# Patient Record
Sex: FEMALE | Race: WHITE | NOT HISPANIC OR LATINO | Employment: OTHER | URBAN - METROPOLITAN AREA
[De-identification: names, ages, dates, MRNs, and addresses within clinical notes are randomized per-mention and may not be internally consistent; named-entity substitution may affect disease eponyms.]

---

## 2017-02-24 ENCOUNTER — TRANSCRIBE ORDERS (OUTPATIENT)
Dept: ADMINISTRATIVE | Facility: HOSPITAL | Age: 52
End: 2017-02-24

## 2017-02-24 DIAGNOSIS — Z12.31 VISIT FOR SCREENING MAMMOGRAM: Primary | ICD-10-CM

## 2017-02-27 ENCOUNTER — HOSPITAL ENCOUNTER (OUTPATIENT)
Dept: MAMMOGRAPHY | Facility: HOSPITAL | Age: 52
Discharge: HOME | End: 2017-02-27
Admitting: OBSTETRICS & GYNECOLOGY

## 2017-02-27 DIAGNOSIS — Z12.31 VISIT FOR SCREENING MAMMOGRAM: ICD-10-CM

## 2017-02-27 PROCEDURE — 77067 SCR MAMMO BI INCL CAD: CPT | Performed by: RADIOLOGY

## 2017-02-27 PROCEDURE — G0202 SCR MAMMO BI INCL CAD: HCPCS

## 2017-02-27 PROCEDURE — 77063 BREAST TOMOSYNTHESIS BI: CPT | Performed by: RADIOLOGY

## 2017-02-27 PROCEDURE — 77063 BREAST TOMOSYNTHESIS BI: CPT

## 2017-03-27 ENCOUNTER — OFFICE VISIT (OUTPATIENT)
Dept: INTERNAL MEDICINE | Facility: CLINIC | Age: 52
End: 2017-03-27

## 2017-03-27 VITALS
SYSTOLIC BLOOD PRESSURE: 125 MMHG | BODY MASS INDEX: 35.55 KG/M2 | HEIGHT: 69 IN | OXYGEN SATURATION: 95 % | HEART RATE: 97 BPM | WEIGHT: 240 LBS | DIASTOLIC BLOOD PRESSURE: 80 MMHG | TEMPERATURE: 98.4 F

## 2017-03-27 DIAGNOSIS — F34.1 DYSTHYMIA: ICD-10-CM

## 2017-03-27 DIAGNOSIS — E03.9 ACQUIRED HYPOTHYROIDISM: ICD-10-CM

## 2017-03-27 DIAGNOSIS — K21.9 GASTROESOPHAGEAL REFLUX DISEASE WITHOUT ESOPHAGITIS: ICD-10-CM

## 2017-03-27 DIAGNOSIS — E78.2 MIXED HYPERLIPIDEMIA: Primary | ICD-10-CM

## 2017-03-27 PROCEDURE — 99214 OFFICE O/P EST MOD 30 MIN: CPT | Performed by: INTERNAL MEDICINE

## 2017-03-27 RX ORDER — OMEPRAZOLE 20 MG/1
CAPSULE, DELAYED RELEASE ORAL
COMMUNITY
Start: 2013-04-04 | End: 2017-05-29 | Stop reason: SDUPTHER

## 2017-03-27 RX ORDER — SIMVASTATIN 40 MG
TABLET ORAL
COMMUNITY
Start: 2013-04-04 | End: 2017-06-26 | Stop reason: SDUPTHER

## 2017-03-27 RX ORDER — TRIAMCINOLONE ACETONIDE 0.25 MG/G
CREAM TOPICAL
COMMUNITY
Start: 2013-02-07 | End: 2017-12-11 | Stop reason: SDUPTHER

## 2017-03-27 NOTE — PROGRESS NOTES
Subjective  Danny Arvizu is a 51 y.o. female    HPI coming in for follow-up patient with a history of hypothyroidism with depression history of dyslipidemia and reflux esophagitis recent weight gain noted. Has not been exercising regularly poor compliance with diet denies polyuria polydipsia denies significant alcohol use no tobacco use    The following portions of the patient's history were reviewed and updated as appropriate: allergies, current medications, past family history, past medical history, past social history, past surgical history, and problem list.     Review of Systems   Constitutional: Negative.  Negative for activity change, appetite change, fatigue and fever.   HENT: Positive for congestion and sore throat. Negative for ear discharge, ear pain and trouble swallowing.    Eyes: Negative for photophobia and visual disturbance.   Respiratory: Positive for cough. Negative for shortness of breath.    Cardiovascular: Negative for chest pain and palpitations.   Gastrointestinal: Negative for abdominal distention, abdominal pain, constipation, diarrhea, nausea and vomiting.   Endocrine: Negative.    Genitourinary: Negative for dysuria, hematuria and urgency.   Musculoskeletal: Positive for arthralgias. Negative for back pain, joint swelling and myalgias.   Skin: Negative for color change and rash.   Allergic/Immunologic: Negative.    Neurological: Negative for dizziness, weakness, light-headedness and headaches.   Hematological: Negative for adenopathy. Does not bruise/bleed easily.   Psychiatric/Behavioral: Negative for agitation, confusion and dysphoric mood. The patient is not nervous/anxious.        Vitals:    03/27/17 1411   BP: 125/80   Pulse: 97   Temp: 98.4 °F (36.9 °C)   SpO2: 95%       Objective  Physical Exam   Constitutional: She is oriented to person, place, and time. She appears well-developed and well-nourished. No distress.   HENT:   Nose: Nose normal.   Mouth/Throat: Oropharynx is clear  and moist.   Eyes: Conjunctivae and EOM are normal. No scleral icterus.   Neck: No tracheal deviation present. No thyromegaly present.   Cardiovascular: Normal rate and regular rhythm.  Exam reveals no friction rub.    No murmur heard.  Pulmonary/Chest: No respiratory distress. She has no wheezes. She has no rales.   Abdominal: Soft. She exhibits no distension and no mass. There is no tenderness. There is no guarding.   Musculoskeletal: Normal range of motion. She exhibits no deformity.   Lymphadenopathy:     She has no cervical adenopathy.   Neurological: She is alert and oriented to person, place, and time. She has normal reflexes. No cranial nerve deficit. Coordination normal.   Skin: Skin is warm and dry. No rash noted. No erythema.   Psychiatric: She has a normal mood and affect. Her behavior is normal. Judgment and thought content normal.       Diagnoses and all orders for this visit:    Mixed hyperlipidemia continue with the statins follow lipid profile discussed diet and weight loss    Gastroesophageal reflux disease without esophagitis continue with reflux precautions and proton pump inhibitors    Acquired hypothyroidism clinically euthyroid follow TSH    Dysthymia stable on current meds

## 2017-03-28 LAB
ALBUMIN SERPL-MCNC: 4.7 G/DL (ref 3.5–5)
ALBUMIN/GLOB SERPL: 1.3 G/DL (ref 1–2)
ALP SERPL-CCNC: 75 U/L (ref 38–126)
ALT SERPL-CCNC: 53 U/L (ref 13–69)
AST SERPL-CCNC: 32 U/L (ref 15–46)
BILIRUB SERPL-MCNC: 0.4 MG/DL (ref 0.2–1.3)
BUN SERPL-MCNC: 13 MG/DL (ref 7–20)
BUN/CREAT SERPL: 14.4 (ref 7.1–23.5)
CALCIUM SERPL-MCNC: 10.1 MG/DL (ref 8.4–10.2)
CHLORIDE SERPL-SCNC: 104 MMOL/L (ref 98–107)
CO2 SERPL-SCNC: 29 MMOL/L (ref 26–30)
CREAT SERPL-MCNC: 0.9 MG/DL (ref 0.6–1.3)
GLOBULIN SER CALC-MCNC: 3.6 GM/DL
GLUCOSE SERPL-MCNC: 66 MG/DL (ref 74–98)
HBA1C MFR BLD: 6 %
LDLC SERPL DIRECT ASSAY-MCNC: 110 MG/DL (ref 0–99)
POTASSIUM SERPL-SCNC: 4.4 MMOL/L (ref 3.5–5.1)
PROT SERPL-MCNC: 8.3 G/DL (ref 6.3–8.2)
SODIUM SERPL-SCNC: 143 MMOL/L (ref 137–145)
TSH SERPL DL<=0.005 MIU/L-ACNC: 3.25 MIU/ML (ref 0.47–4.68)

## 2017-04-17 ENCOUNTER — OFFICE VISIT (OUTPATIENT)
Dept: INTERNAL MEDICINE | Facility: CLINIC | Age: 52
End: 2017-04-17

## 2017-04-17 VITALS
SYSTOLIC BLOOD PRESSURE: 121 MMHG | DIASTOLIC BLOOD PRESSURE: 80 MMHG | HEIGHT: 69 IN | OXYGEN SATURATION: 94 % | HEART RATE: 92 BPM | TEMPERATURE: 98 F

## 2017-04-17 DIAGNOSIS — H92.02 OTALGIA, LEFT: Primary | ICD-10-CM

## 2017-04-17 PROCEDURE — 99213 OFFICE O/P EST LOW 20 MIN: CPT | Performed by: INTERNAL MEDICINE

## 2017-04-17 NOTE — PROGRESS NOTES
Subjective  Lasonia PARVIN Arvizu is a 51 y.o. female    HPI one week history of left ear discomfort without any drainage complains of recurring nasal congestion and postnasal drainage no fever or chills is taking some decongestions without much relief he did nonsmoker    The following portions of the patient's history were reviewed and updated as appropriate: allergies, current medications, past family history, past medical history, past social history, past surgical history, and problem list.     Review of Systems   Constitutional: Negative for fatigue and fever.   HENT: Positive for ear pain. Negative for rhinorrhea, sinus pressure and trouble swallowing.    Respiratory: Positive for cough.        Vitals:    04/17/17 1525   BP: 121/80   Pulse: 92   Temp: 98 °F (36.7 °C)   SpO2: 94%       Objective  Physical Exam   Constitutional: She appears well-developed and well-nourished. No distress.   HENT:   Nose: Nose normal.   Mouth/Throat: Oropharynx is clear and moist.   Eyes: Conjunctivae and EOM are normal. No scleral icterus.   Neck: No tracheal deviation present. No thyromegaly present.   Cardiovascular: Normal rate and regular rhythm.  Exam reveals no friction rub.    No murmur heard.  Pulmonary/Chest: No respiratory distress. She has no wheezes. She has no rales.   Musculoskeletal: Normal range of motion. She exhibits no deformity.   Lymphadenopathy:     She has no cervical adenopathy.   Neurological: No cranial nerve deficit. Coordination normal.   Skin: No rash noted. No erythema.       Diagnoses and all orders for this visit:    Otalgia, left. Exam unremarkable advised warm water with salt gargles continue with OTC decongestions consider adding on a steroid nose spray

## 2017-05-30 RX ORDER — OMEPRAZOLE 20 MG/1
CAPSULE, DELAYED RELEASE ORAL
Qty: 90 CAPSULE | Refills: 3 | Status: SHIPPED | OUTPATIENT
Start: 2017-05-30 | End: 2018-05-14 | Stop reason: SDUPTHER

## 2017-06-26 RX ORDER — SIMVASTATIN 40 MG
TABLET ORAL
Qty: 90 TABLET | Refills: 3 | Status: SHIPPED | OUTPATIENT
Start: 2017-06-26 | End: 2018-06-18 | Stop reason: SDUPTHER

## 2017-12-07 RX ORDER — LEVOTHYROXINE SODIUM 0.03 MG/1
TABLET ORAL
Qty: 90 TABLET | Refills: 3 | Status: SHIPPED | OUTPATIENT
Start: 2017-12-07 | End: 2018-11-02 | Stop reason: SDUPTHER

## 2017-12-11 ENCOUNTER — OFFICE VISIT (OUTPATIENT)
Dept: INTERNAL MEDICINE | Facility: CLINIC | Age: 52
End: 2017-12-11

## 2017-12-11 VITALS
BODY MASS INDEX: 35.4 KG/M2 | HEIGHT: 69 IN | DIASTOLIC BLOOD PRESSURE: 60 MMHG | OXYGEN SATURATION: 99 % | WEIGHT: 239 LBS | SYSTOLIC BLOOD PRESSURE: 118 MMHG | TEMPERATURE: 98.2 F | HEART RATE: 83 BPM

## 2017-12-11 DIAGNOSIS — J01.00 ACUTE NON-RECURRENT MAXILLARY SINUSITIS: Primary | ICD-10-CM

## 2017-12-11 PROCEDURE — 99213 OFFICE O/P EST LOW 20 MIN: CPT | Performed by: PHYSICIAN ASSISTANT

## 2017-12-11 RX ORDER — DEXTROMETHORPHAN HYDROBROMIDE AND PROMETHAZINE HYDROCHLORIDE 15; 6.25 MG/5ML; MG/5ML
5 SYRUP ORAL NIGHTLY PRN
Qty: 120 ML | Refills: 0 | Status: SHIPPED | OUTPATIENT
Start: 2017-12-11 | End: 2018-02-21

## 2017-12-11 RX ORDER — TRIAMCINOLONE ACETONIDE 0.25 MG/G
CREAM TOPICAL 2 TIMES DAILY
Qty: 15 G | Refills: 2 | Status: CANCELLED | OUTPATIENT
Start: 2017-12-11

## 2017-12-11 RX ORDER — AMOXICILLIN 500 MG/1
1000 CAPSULE ORAL 2 TIMES DAILY
Qty: 14 CAPSULE | Refills: 0 | Status: SHIPPED | OUTPATIENT
Start: 2017-12-11 | End: 2017-12-18

## 2017-12-11 RX ORDER — AMOXICILLIN AND CLAVULANATE POTASSIUM 875; 125 MG/1; MG/1
1 TABLET, FILM COATED ORAL 2 TIMES DAILY
Qty: 10 TABLET | Refills: 0 | Status: CANCELLED | OUTPATIENT
Start: 2017-12-11

## 2017-12-11 RX ORDER — TRIAMCINOLONE ACETONIDE 0.25 MG/G
CREAM TOPICAL 2 TIMES DAILY
Qty: 80 G | Refills: 1 | Status: SHIPPED | OUTPATIENT
Start: 2017-12-11 | End: 2018-01-29

## 2017-12-11 NOTE — PROGRESS NOTES
"Subjective     Chief Complaint: sinus pressure, cough    History of Present Illness     Danny Arvizu is a 52 y.o. female presenting with nearly 10 days sinus pressure, congestion, cough.  She has been using Mucinex D OTC.  Some low-grade fevers at home.  Cough is preventing sleep.  Feels like she is getting worse and not better.    The following portions of the patient's history were reviewed and updated as appropriate: current medications, allergies, PMH.    Review of Systems   Constitutional: Positive for fatigue and fever.   HENT: Positive for congestion, ear pain, sinus pain and sinus pressure. Negative for sore throat.    Respiratory: Positive for cough. Negative for chest tightness, shortness of breath and wheezing.    Cardiovascular: Negative.    Gastrointestinal: Negative.        Objective     Vitals:    12/11/17 1315   BP: 118/60   Pulse: 83   Temp: 98.2 °F (36.8 °C)   SpO2: 99%   Weight: 108 kg (239 lb)   Height: 175.3 cm (69.02\")       Physical Exam   Constitutional: She appears well-developed and well-nourished. No distress.   HENT:   Head: Normocephalic and atraumatic.   Right Ear: Tympanic membrane, external ear and ear canal normal.   Left Ear: Tympanic membrane, external ear and ear canal normal.   Nose: Rhinorrhea present. Right sinus exhibits maxillary sinus tenderness and frontal sinus tenderness. Left sinus exhibits maxillary sinus tenderness and frontal sinus tenderness.   Mouth/Throat: Posterior oropharyngeal erythema present. No oropharyngeal exudate.   Cardiovascular: Normal rate, regular rhythm and normal heart sounds.    Pulmonary/Chest: Effort normal and breath sounds normal. No respiratory distress. She has no wheezes.   Abdominal: Soft. Bowel sounds are normal.   Lymphadenopathy:     She has no cervical adenopathy.        Assessment/Plan     Diagnoses and all orders for this visit:    Acute non-recurrent maxillary sinusitis  -     promethazine-dextromethorphan (PROMETHAZINE-DM) " 6.25-15 MG/5ML syrup; Take 5 mL by mouth At Night As Needed for Cough.  -     amoxicillin (AMOXIL) 500 MG capsule; Take 2 capsules by mouth 2 (Two) Times a Day for 7 days.    Encouraged patient to rest and stay well hydrated.  Finish course of antibiotics.  Return to clinic if symptoms worsen or fail to improve.      Mayda Salguero PA-C  12/11/2017         Please note that portions of this note were completed with a voice recognition program. Efforts were made to edit dictation, but occasionally words are mistranscribed.

## 2018-01-29 ENCOUNTER — OFFICE VISIT (OUTPATIENT)
Dept: INTERNAL MEDICINE | Facility: CLINIC | Age: 53
End: 2018-01-29

## 2018-01-29 VITALS
DIASTOLIC BLOOD PRESSURE: 80 MMHG | BODY MASS INDEX: 35.25 KG/M2 | OXYGEN SATURATION: 97 % | TEMPERATURE: 98.2 F | HEIGHT: 69 IN | SYSTOLIC BLOOD PRESSURE: 132 MMHG | RESPIRATION RATE: 14 BRPM | HEART RATE: 100 BPM | WEIGHT: 238 LBS

## 2018-01-29 DIAGNOSIS — J01.00 SUBACUTE MAXILLARY SINUSITIS: Primary | ICD-10-CM

## 2018-01-29 PROCEDURE — 99213 OFFICE O/P EST LOW 20 MIN: CPT | Performed by: INTERNAL MEDICINE

## 2018-01-29 RX ORDER — AMOXICILLIN AND CLAVULANATE POTASSIUM 875; 125 MG/1; MG/1
1 TABLET, FILM COATED ORAL EVERY 12 HOURS SCHEDULED
Qty: 14 TABLET | Refills: 0 | Status: SHIPPED | OUTPATIENT
Start: 2018-01-29 | End: 2018-02-21

## 2018-01-29 NOTE — PROGRESS NOTES
"Subjective  Lasonia PARVIN Arvizu is a 52 y.o. female    HPI one-week history of nasal congestion and facial pressure postnasal drainage with greenish discharge. Nonsmoker. Is using Afrin for the last 3 days. Has not used a steroid spray.    The following portions of the patient's history were reviewed and updated as appropriate: allergies, current medications, past family history, past medical history, past social history, past surgical history, and problem list.     Review of Systems   Constitutional: Negative.  Negative for activity change, appetite change, fatigue and fever.   HENT: Positive for postnasal drip, rhinorrhea, sinus pain and sinus pressure. Negative for congestion, ear discharge, ear pain and trouble swallowing.    Eyes: Negative for photophobia and visual disturbance.   Respiratory: Negative for cough and shortness of breath.    Cardiovascular: Negative for chest pain and palpitations.   Gastrointestinal: Negative for abdominal distention, abdominal pain, constipation, diarrhea, nausea and vomiting.   Endocrine: Negative.    Genitourinary: Negative for dysuria, hematuria and urgency.   Musculoskeletal: Negative for arthralgias, back pain, joint swelling and myalgias.   Skin: Negative for color change and rash.   Allergic/Immunologic: Negative.    Neurological: Negative for dizziness, weakness, light-headedness and headaches.   Hematological: Negative for adenopathy. Does not bruise/bleed easily.   Psychiatric/Behavioral: Negative for agitation, confusion and dysphoric mood. The patient is not nervous/anxious.        Visit Vitals   • /80   • Pulse 100   • Temp 98.2 °F (36.8 °C)   • Resp 14   • Ht 175.3 cm (69\")   • Wt 108 kg (238 lb)   • SpO2 97%   • BMI 35.15 kg/m2       Objective  Physical Exam   Constitutional: She is oriented to person, place, and time. She appears well-developed and well-nourished. No distress.   HENT:   Nose: Nose normal.   Mouth/Throat: Oropharynx is clear and moist.   Eyes: " Conjunctivae and EOM are normal. No scleral icterus.   Neck: No tracheal deviation present. No thyromegaly present.   Cardiovascular: Normal rate and regular rhythm.  Exam reveals no friction rub.    No murmur heard.  Pulmonary/Chest: No respiratory distress. She has no wheezes. She has no rales.   Abdominal: Soft. She exhibits no distension and no mass. There is no tenderness. There is no guarding.   Musculoskeletal: Normal range of motion. She exhibits no deformity.   Lymphadenopathy:     She has no cervical adenopathy.   Neurological: She is alert and oriented to person, place, and time. She has normal reflexes. No cranial nerve deficit. Coordination normal.   Skin: Skin is warm and dry. No rash noted. No erythema.   Psychiatric: She has a normal mood and affect. Her behavior is normal. Judgment and thought content normal.       Diagnoses and all orders for this visit:    Subacute maxillary sinusitis. Empiric antibiotic therapy. Added on steroid nose spray NSAIDs when necessary

## 2018-02-21 ENCOUNTER — TELEPHONE (OUTPATIENT)
Dept: SURGERY | Facility: CLINIC | Age: 53
End: 2018-02-21

## 2018-02-21 ENCOUNTER — OFFICE VISIT (OUTPATIENT)
Dept: INTERNAL MEDICINE | Facility: CLINIC | Age: 53
End: 2018-02-21

## 2018-02-21 VITALS
HEIGHT: 69 IN | WEIGHT: 231 LBS | TEMPERATURE: 98.4 F | BODY MASS INDEX: 34.21 KG/M2 | OXYGEN SATURATION: 93 % | DIASTOLIC BLOOD PRESSURE: 80 MMHG | HEART RATE: 105 BPM | SYSTOLIC BLOOD PRESSURE: 124 MMHG

## 2018-02-21 DIAGNOSIS — Z12.11 ENCOUNTER FOR SCREENING COLONOSCOPY: ICD-10-CM

## 2018-02-21 DIAGNOSIS — R19.7 DIARRHEA, UNSPECIFIED TYPE: Primary | ICD-10-CM

## 2018-02-21 LAB
ALBUMIN SERPL-MCNC: 4 G/DL (ref 3.5–5)
ALBUMIN/GLOB SERPL: 1.3 G/DL (ref 1–2)
ALP SERPL-CCNC: 90 U/L (ref 38–126)
ALT SERPL-CCNC: 64 U/L (ref 13–69)
AST SERPL-CCNC: 31 U/L (ref 15–46)
BASOPHILS # BLD AUTO: 0.08 10*3/MM3 (ref 0–0.2)
BASOPHILS NFR BLD AUTO: 0.9 % (ref 0–2.5)
BILIRUB SERPL-MCNC: 0.4 MG/DL (ref 0.2–1.3)
BUN SERPL-MCNC: 11 MG/DL (ref 7–20)
BUN/CREAT SERPL: 12.2 (ref 7.1–23.5)
CALCIUM SERPL-MCNC: 9.5 MG/DL (ref 8.4–10.2)
CHLORIDE SERPL-SCNC: 103 MMOL/L (ref 98–107)
CO2 SERPL-SCNC: 25 MMOL/L (ref 26–30)
CREAT SERPL-MCNC: 0.9 MG/DL (ref 0.6–1.3)
EOSINOPHIL # BLD AUTO: 0.24 10*3/MM3 (ref 0–0.7)
EOSINOPHIL NFR BLD AUTO: 2.7 % (ref 0–7)
ERYTHROCYTE [DISTWIDTH] IN BLOOD BY AUTOMATED COUNT: 13.3 % (ref 11.5–14.5)
GFR SERPLBLD CREATININE-BSD FMLA CKD-EPI: 65 ML/MIN/1.73
GFR SERPLBLD CREATININE-BSD FMLA CKD-EPI: 79 ML/MIN/1.73
GLOBULIN SER CALC-MCNC: 3.1 GM/DL
GLUCOSE SERPL-MCNC: 104 MG/DL (ref 74–98)
HCT VFR BLD AUTO: 43.2 % (ref 37–47)
HGB BLD-MCNC: 14.3 G/DL (ref 12–16)
IMM GRANULOCYTES # BLD: 0.04 10*3/MM3 (ref 0–0.06)
IMM GRANULOCYTES NFR BLD: 0.5 % (ref 0–0.6)
LYMPHOCYTES # BLD AUTO: 2.34 10*3/MM3 (ref 0.6–3.4)
LYMPHOCYTES NFR BLD AUTO: 26.6 % (ref 10–50)
MCH RBC QN AUTO: 28.5 PG (ref 27–31)
MCHC RBC AUTO-ENTMCNC: 33.1 G/DL (ref 30–37)
MCV RBC AUTO: 86.1 FL (ref 81–99)
MONOCYTES # BLD AUTO: 1.13 10*3/MM3 (ref 0–0.9)
MONOCYTES NFR BLD AUTO: 12.8 % (ref 0–12)
NEUTROPHILS # BLD AUTO: 4.98 10*3/MM3 (ref 2–6.9)
NEUTROPHILS NFR BLD AUTO: 56.5 % (ref 37–80)
NRBC BLD AUTO-RTO: 0 /100 WBC (ref 0–0)
PLATELET # BLD AUTO: 309 10*3/MM3 (ref 130–400)
POTASSIUM SERPL-SCNC: 4.3 MMOL/L (ref 3.5–5.1)
PROT SERPL-MCNC: 7.1 G/DL (ref 6.3–8.2)
RBC # BLD AUTO: 5.02 10*6/MM3 (ref 4.2–5.4)
SODIUM SERPL-SCNC: 144 MMOL/L (ref 137–145)
TSH SERPL DL<=0.005 MIU/L-ACNC: 3.99 MIU/ML (ref 0.47–4.68)
WBC # BLD AUTO: 8.81 10*3/MM3 (ref 4.8–10.8)

## 2018-02-21 PROCEDURE — 99213 OFFICE O/P EST LOW 20 MIN: CPT | Performed by: PHYSICIAN ASSISTANT

## 2018-02-21 NOTE — PROGRESS NOTES
"Subjective     Chief Complaint: diarrhea    History of Present Illness     Danny Arvizu is a 52 y.o. female presenting with complaints of diarrhea x 13 days. She recently completed a course of Augmentin for sinus infection. Experiences crampy abdominal pain prior to diarrhea, mostly relieved with defecating. She has used imodium some, which has reduced the amount of diarrhea, but still going every couple of hours. Waking her up at night. Denies blood or mucus in stool. Denies dark, tarry stools. No vomiting. She has not changed her diet, but seems to be eating less due to the diarrhea. No new medications or supplements. No recent travel. Denies added stress in her life. No history of C. Diff. Does not take probiotics.    She has not had a colonoscopy yet. Would like to be referred to Dr. Jeter for this.    The following portions of the patient's history were reviewed and updated as appropriate: current medications, allergies, PMH.    Review of Systems   Constitutional: Negative.    Respiratory: Negative.    Cardiovascular: Negative.    Gastrointestinal: Positive for diarrhea. Negative for abdominal pain, blood in stool, constipation, nausea, rectal pain and vomiting.   Neurological: Negative.      Objective     Vitals:    02/21/18 0825   BP: 124/80   Pulse: 105   Temp: 98.4 °F (36.9 °C)   SpO2: 93%   Weight: 105 kg (231 lb)   Height: 175.3 cm (69.02\")       Physical Exam   Constitutional: Appears well-developed and well-nourished.   Cardiovascular: Normal rate, regular rhythm and normal heart sounds.    Pulmonary/Chest: Effort normal and breath sounds normal.   Abdominal: Soft. Bowel sounds are normal. Mildly tender to palpation RLQ and LLQ. Patterson sign negative.  Lymphadenopathy: No cervical adenopathy noted.   Neurological: Alert and oriented to person, place, and time.   Skin: Skin is warm and dry.   Psychiatric: Exhibits a normal mood and affect.     Assessment/Plan     Diagnoses and all orders for this " visit:    Diarrhea, unspecified type  -     CBC & Differential  -     Fecal Leukocytes - Stool, Per Rectum  -     Ova & Parasite Examination - Stool, Per Rectum  -     Stool Culture - Stool, Per Rectum  -     Gastrointestinal Panel, PCR - Stool, Per Rectum  -     Comprehensive Metabolic Panel  -     TSH    Encounter for screening colonoscopy  -     Ambulatory Referral For Screening Colonoscopy      Follow up after labs.  We discussed the importance of staying hydrated with episodes of diarrhea.    Mayda Salguero PA-C  02/21/2018         Please note that portions of this note were completed with a voice recognition program. Efforts were made to edit dictation, but occasionally words are mistranscribed.

## 2018-02-23 LAB — Lab: NORMAL

## 2018-02-25 LAB
ADV 40+41 DNA STL QL NAA+NON-PROBE: NOT DETECTED
ASTRO TYP 1-8 RNA STL QL NAA+NON-PROBE: NOT DETECTED
BACTERIA SPEC CULT: NORMAL
BACTERIA SPEC CULT: NORMAL
C CAYETANENSIS DNA STL QL NAA+NON-PROBE: NOT DETECTED
C COLI+JEJ+UPSA DNA STL QL NAA+NON-PROBE: NOT DETECTED
C DIF TOX TCDA+TCDB STL QL NAA+NON-PROBE: DETECTED
CAMPYLOBACTER STL CULT: NORMAL
CRYPTOSP DNA STL QL NAA+NON-PROBE: NOT DETECTED
E COLI O157 DNA STL QL NAA+NON-PROBE: ABNORMAL
E COLI SXT STL QL IA: NEGATIVE
E HISTOLYT DNA STL QL NAA+NON-PROBE: NOT DETECTED
EAEC PAA PLAS AGGR+AATA ST NAA+NON-PRB: NOT DETECTED
EC STX1+STX2 GENES STL QL NAA+NON-PROBE: NOT DETECTED
EPEC EAE GENE STL QL NAA+NON-PROBE: NOT DETECTED
ETEC LTA+ST1A+ST1B TOX ST NAA+NON-PROBE: NOT DETECTED
G LAMBLIA DNA STL QL NAA+NON-PROBE: NOT DETECTED
NOROVIRUS GI+II RNA STL QL NAA+NON-PROBE: NOT DETECTED
O+P SPEC MICRO: NORMAL
O+P STL CONC: NORMAL
P SHIGELLOIDES DNA STL QL NAA+NON-PROBE: NOT DETECTED
RVA RNA STL QL NAA+NON-PROBE: NOT DETECTED
S ENT+BONG DNA STL QL NAA+NON-PROBE: NOT DETECTED
SALM + SHIG STL CULT: NORMAL
SAPO I+II+IV+V RNA STL QL NAA+NON-PROBE: NOT DETECTED
SHIGELLA SP+EIEC IPAH ST NAA+NON-PROBE: NOT DETECTED
V CHOL+PARA+VUL DNA STL QL NAA+NON-PROBE: NOT DETECTED
V CHOLERAE DNA STL QL NAA+NON-PROBE: NOT DETECTED
WBC STL QL MICRO: ABNORMAL
WBC STL QL MICRO: ABNORMAL
Y ENTEROCOL DNA STL QL NAA+NON-PROBE: NOT DETECTED

## 2018-02-26 DIAGNOSIS — A04.72 C. DIFFICILE DIARRHEA: ICD-10-CM

## 2018-02-26 DIAGNOSIS — A04.72 C. DIFFICILE DIARRHEA: Primary | ICD-10-CM

## 2018-02-26 RX ORDER — METRONIDAZOLE 500 MG/1
500 TABLET ORAL 3 TIMES DAILY
Qty: 30 TABLET | Refills: 0 | Status: SHIPPED | OUTPATIENT
Start: 2018-02-26 | End: 2018-02-26 | Stop reason: SDUPTHER

## 2018-02-26 RX ORDER — ONDANSETRON HYDROCHLORIDE 8 MG/1
8 TABLET, FILM COATED ORAL EVERY 8 HOURS PRN
Qty: 12 TABLET | Refills: 0 | Status: SHIPPED | OUTPATIENT
Start: 2018-02-26 | End: 2018-03-09 | Stop reason: ALTCHOICE

## 2018-02-26 RX ORDER — METRONIDAZOLE 500 MG/1
500 TABLET ORAL 3 TIMES DAILY
Qty: 30 TABLET | Refills: 0 | Status: SHIPPED | OUTPATIENT
Start: 2018-02-26 | End: 2018-03-08

## 2018-03-09 ENCOUNTER — OFFICE VISIT (OUTPATIENT)
Dept: SURGERY | Facility: CLINIC | Age: 53
End: 2018-03-09

## 2018-03-09 VITALS
BODY MASS INDEX: 34.36 KG/M2 | RESPIRATION RATE: 18 BRPM | HEART RATE: 104 BPM | TEMPERATURE: 98.6 F | HEIGHT: 69 IN | WEIGHT: 232 LBS | SYSTOLIC BLOOD PRESSURE: 138 MMHG | DIASTOLIC BLOOD PRESSURE: 86 MMHG | OXYGEN SATURATION: 98 %

## 2018-03-09 DIAGNOSIS — K52.9 COLITIS PRESUMED INFECTIOUS: ICD-10-CM

## 2018-03-09 DIAGNOSIS — K52.9 CHRONIC DIARRHEA: Primary | ICD-10-CM

## 2018-03-09 PROCEDURE — 99203 OFFICE O/P NEW LOW 30 MIN: CPT | Performed by: SURGERY

## 2018-03-09 NOTE — PROGRESS NOTES
Patient: Danny Arvizu    YOB: 1965    Date: 03/09/2018    Primary Care Provider: Neel Orellana MD    Chief Complaint   Patient presents with   • Diarrhea     with recent c-diff.       Subjective .     History of present illness:  I saw the patient in the office today as a consultation for evaluation and treatment of recent bout of diarrhea which lasted @ one month, pt did have recent c-diff per stool culture, she was treated by her primary care provider and she stated that she is feeling much better at this time.  Pt denies change in bowel habits at this time including constipation and/or diarrhea, she also denies dark colored stool and she denies visible rectal bleeding, she denies family history of colon cancer as well. she has never had a screening colonoscopy performed before.    The following portions of the patient's history were reviewed and updated as appropriate: allergies, current medications, past family history, past medical history, past social history, past surgical history and problem list.      Review of Systems   Constitutional: Negative for chills, fever and unexpected weight change.   HENT: Negative for hearing loss, trouble swallowing and voice change.    Eyes: Negative for visual disturbance.   Respiratory: Negative for apnea, cough, chest tightness, shortness of breath and wheezing.    Cardiovascular: Negative for chest pain, palpitations and leg swelling.   Gastrointestinal: Negative for abdominal distention, abdominal pain, anal bleeding, blood in stool, constipation, diarrhea, nausea, rectal pain and vomiting.   Endocrine: Negative for cold intolerance and heat intolerance.   Genitourinary: Negative for difficulty urinating, dysuria and flank pain.   Musculoskeletal: Negative for back pain and gait problem.   Skin: Negative for color change, rash and wound.   Neurological: Negative for dizziness, syncope, speech difficulty, weakness, light-headedness, numbness and  headaches.   Hematological: Negative for adenopathy. Does not bruise/bleed easily.   Psychiatric/Behavioral: Negative for confusion. The patient is not nervous/anxious.        History:  Past Medical History:   Diagnosis Date   • C. difficile diarrhea    • Hyperglycemia        Past Surgical History:   Procedure Laterality Date   • DILATATION AND CURETTAGE     • FINGER SURGERY     • FOOT SURGERY     • HYSTEROSCOPY ENDOMETRIAL ABLATION         Family History   Problem Relation Age of Onset   • Heart attack Father    • Hypertension Father    • Heart attack Other      Grandmother & Uncles   • Cancer Other      Grandmother   • Hypertension Other      Grandparents   • Breast cancer Cousin 35       Social History   Substance Use Topics   • Smoking status: Never Smoker   • Smokeless tobacco: Never Used   • Alcohol use No       Allergies:  Allergies   Allergen Reactions   • Demerol [Meperidine]    • Tetracyclines & Related        Medications:    Current Outpatient Prescriptions:   •  levothyroxine (SYNTHROID, LEVOTHROID) 25 MCG tablet, TAKE ONE TABLET BY MOUTH ONCE DAILY, Disp: 90 tablet, Rfl: 3  •  omeprazole (priLOSEC) 20 MG capsule, TAKE ONE CAPSULE BY MOUTH ONCE DAILY, Disp: 90 capsule, Rfl: 3  •  sertraline (ZOLOFT) 50 MG tablet, TAKE ONE TABLET BY MOUTH ONCE DAILY, Disp: 90 tablet, Rfl: 3  •  simvastatin (ZOCOR) 40 MG tablet, TAKE ONE TABLET BY MOUTH ONCE DAILY, Disp: 90 tablet, Rfl: 3    Objective     Vital Signs:   Temp:  [98.6 °F (37 °C)] 98.6 °F (37 °C)  Heart Rate:  [104] 104  Resp:  [18] 18  BP: (138)/(86) 138/86    Physical Exam:   General Appearance:    Alert, cooperative, in no acute distress   Head:    Normocephalic, without obvious abnormality, atraumatic   Eyes:            Lids and lashes normal, conjunctivae and sclerae normal, no   icterus, no pallor, corneas clear, PERRL   Ears:    Ears appear intact with no abnormalities noted   Throat:   No oral lesions, no thrush, oral mucosa moist   Neck:   No  adenopathy, supple, trachea midline, no thyromegaly,  no JVD   Lungs:     Clear to auscultation,respirations regular, even and                  unlabored    Heart:    Regular rhythm and normal rate, normal S1 and S2, no            murmur   Abdomen:     no masses, no organomegaly, soft non-tender, non-distended, no guarding, there is no evidence of tenderness   Extremities:   Moves all extremities well, no edema, no cyanosis, no             redness   Pulses:   Pulses palpable and equal bilaterally   Skin:   No bleeding, bruising or rash   Lymph nodes:   No palpable adenopathy   Neurologic:   Cranial nerves 2 - 12 grossly intact, sensation intact      Results Review:   I reviewed the patient's new clinical results.    Review of Systems was reviewed and confirmed as accurate today.    Assessment/Plan :    1. Chronic diarrhea    2. Colitis presumed infectious        I recommend a colonoscopy for further evaluation. The procedure was explained as well as the risks which include but are not limited to bleeding, infection, perforation, abdominal pain etc. The patient understands these risks and the procedure and wishes to proceed.     Electronically signed by Radha Jeter MD  03/09/18 1:54 PM      Scribed for Radha Jeter MD by Irish Rosenthal. 3/9/2018  2:08 PM

## 2018-03-12 PROBLEM — K52.9 CHRONIC DIARRHEA: Status: ACTIVE | Noted: 2018-03-12

## 2018-03-12 PROBLEM — K52.9 COLITIS PRESUMED INFECTIOUS: Status: ACTIVE | Noted: 2018-03-12

## 2018-03-16 ENCOUNTER — TELEPHONE (OUTPATIENT)
Dept: INTERNAL MEDICINE | Facility: CLINIC | Age: 53
End: 2018-03-16

## 2018-03-21 ENCOUNTER — OFFICE VISIT (OUTPATIENT)
Dept: SURGERY | Facility: CLINIC | Age: 53
End: 2018-03-21

## 2018-03-21 ENCOUNTER — TELEPHONE (OUTPATIENT)
Dept: SURGERY | Facility: CLINIC | Age: 53
End: 2018-03-21

## 2018-03-21 VITALS
BODY MASS INDEX: 33.22 KG/M2 | DIASTOLIC BLOOD PRESSURE: 82 MMHG | HEART RATE: 98 BPM | SYSTOLIC BLOOD PRESSURE: 126 MMHG | HEIGHT: 69 IN | OXYGEN SATURATION: 98 % | TEMPERATURE: 98.3 F | WEIGHT: 224.3 LBS

## 2018-03-21 DIAGNOSIS — K52.9 CHRONIC DIARRHEA: Primary | ICD-10-CM

## 2018-03-21 DIAGNOSIS — R10.31 RIGHT LOWER QUADRANT ABDOMINAL PAIN: ICD-10-CM

## 2018-03-21 PROCEDURE — 99213 OFFICE O/P EST LOW 20 MIN: CPT | Performed by: SURGERY

## 2018-03-21 NOTE — PROGRESS NOTES
Patient: Danny Arvizu    YOB: 1965    Date: 03/21/2018    Primary Care Provider: Neel Orellana MD    Chief Complaint   Patient presents with   • Follow-up     Follow up diarrhea and c-diff       History: The patient is in the office today for a repeat evaluation of her diarrhea.  She is scheduled for colonoscopy in April.  She is still having diarrhea.  She has been on a round of antibiotics for c-diff but she says that it has come back.  She says she has a hemorrhoid that is causing a lot of pain.  She says she did see a little blood in her stool.  She says her back hurts and thinks its related to the hemorrhoid.  Her pcp was going to send in Vancomycin but it is not at the pharmacy.Minimal relief with Imodium AD.      The following portions of the patient's history were reviewed and updated as appropriate: allergies, current medications, past family history, past medical history, past social history, past surgical history and problem list.      Review of Systems   Constitutional: Negative for chills, fever and unexpected weight change.   HENT: Negative for hearing loss, trouble swallowing and voice change.    Eyes: Negative for visual disturbance.   Respiratory: Negative for apnea, cough, chest tightness, shortness of breath and wheezing.    Cardiovascular: Negative for chest pain, palpitations and leg swelling.   Gastrointestinal: Positive for diarrhea. Negative for abdominal distention, abdominal pain, anal bleeding, blood in stool, constipation, nausea, rectal pain and vomiting.   Endocrine: Negative for cold intolerance and heat intolerance.   Genitourinary: Negative for difficulty urinating, dysuria and flank pain.   Musculoskeletal: Negative for back pain and gait problem.   Skin: Negative for color change, rash and wound.   Neurological: Negative for dizziness, syncope, speech difficulty, weakness, light-headedness, numbness and headaches.   Hematological: Negative for adenopathy. Does  "not bruise/bleed easily.   Psychiatric/Behavioral: Negative for confusion. The patient is not nervous/anxious.        Vital Signs  /82   Pulse 98   Temp 98.3 °F (36.8 °C) (Temporal Artery )   Ht 175.3 cm (69\")   Wt 102 kg (224 lb 4.8 oz)   SpO2 98%   BMI 33.12 kg/m²     Allergies:  Allergies   Allergen Reactions   • Demerol [Meperidine]    • Tetracyclines & Related        Medications:    Current Outpatient Prescriptions:   •  levothyroxine (SYNTHROID, LEVOTHROID) 25 MCG tablet, TAKE ONE TABLET BY MOUTH ONCE DAILY, Disp: 90 tablet, Rfl: 3  •  omeprazole (priLOSEC) 20 MG capsule, TAKE ONE CAPSULE BY MOUTH ONCE DAILY, Disp: 90 capsule, Rfl: 3  •  sertraline (ZOLOFT) 50 MG tablet, TAKE ONE TABLET BY MOUTH ONCE DAILY, Disp: 90 tablet, Rfl: 3  •  simvastatin (ZOCOR) 40 MG tablet, TAKE ONE TABLET BY MOUTH ONCE DAILY, Disp: 90 tablet, Rfl: 3  •  vancomycin (VANCOCIN) 125 MG capsule, Take 1 capsule by mouth 4 (Four) Times a Day for 14 days., Disp: 56 capsule, Rfl: 0    Physical Exam:   General Appearance:    Alert, cooperative, in no acute distress   Head:    Normocephalic, without obvious abnormality, atraumatic   Lungs:     Clear to auscultation,respirations regular, even and                  unlabored    Heart:    Regular rhythm and normal rate, normal S1 and S2, no            murmur, no gallop, no rub, no click   Abdomen:     Normal bowel sounds, no masses, no organomegaly, soft        non-tender, non-distended, no guarding, no rebound                tenderness   Extremities:   Moves all extremities well, no edema, no cyanosis, no             redness   Pulses:   Pulses palpable and equal bilaterally   Skin:   No bleeding, bruising or rash       Results Review:   I reviewed the patient's new clinical results.     Assessment/Plan     1. Chronic diarrhea    2. Right lower quadrant abdominal pain      Vancomycin 125 mg by mouth 4 times a day for 14 days has been called in the pharmacy.  Maintain her schedule " colonoscopy for next month.  Risk of bleeding perforation discussed and patient agreeable  Electronically signed by Radha Jeter MD  03/21/18    Scribed for Radha Jeter MD by Michelle Milligan. 3/21/2018  10:06 AM

## 2018-04-13 ENCOUNTER — ANESTHESIA EVENT (OUTPATIENT)
Dept: GASTROENTEROLOGY | Facility: HOSPITAL | Age: 53
End: 2018-04-13

## 2018-04-13 ENCOUNTER — ANESTHESIA (OUTPATIENT)
Dept: GASTROENTEROLOGY | Facility: HOSPITAL | Age: 53
End: 2018-04-13

## 2018-04-13 ENCOUNTER — HOSPITAL ENCOUNTER (OUTPATIENT)
Facility: HOSPITAL | Age: 53
Setting detail: HOSPITAL OUTPATIENT SURGERY
Discharge: HOME | End: 2018-04-13
Attending: SURGERY | Admitting: SURGERY

## 2018-04-13 VITALS
OXYGEN SATURATION: 97 % | WEIGHT: 225 LBS | HEART RATE: 73 BPM | HEIGHT: 69 IN | DIASTOLIC BLOOD PRESSURE: 80 MMHG | BODY MASS INDEX: 33.33 KG/M2 | RESPIRATION RATE: 20 BRPM | TEMPERATURE: 98.5 F | SYSTOLIC BLOOD PRESSURE: 134 MMHG

## 2018-04-13 PROCEDURE — 25010000002 PROPOFOL 10 MG/ML EMULSION: Performed by: NURSE ANESTHETIST, CERTIFIED REGISTERED

## 2018-04-13 RX ORDER — SODIUM CHLORIDE 0.9 % (FLUSH) 0.9 %
3 SYRINGE (ML) INJECTION AS NEEDED
Status: DISCONTINUED | OUTPATIENT
Start: 2018-04-13 | End: 2018-04-13 | Stop reason: HOSPADM

## 2018-04-13 RX ORDER — SODIUM CHLORIDE, SODIUM LACTATE, POTASSIUM CHLORIDE, CALCIUM CHLORIDE 600; 310; 30; 20 MG/100ML; MG/100ML; MG/100ML; MG/100ML
1000 INJECTION, SOLUTION INTRAVENOUS CONTINUOUS
Status: DISCONTINUED | OUTPATIENT
Start: 2018-04-13 | End: 2018-04-13 | Stop reason: HOSPADM

## 2018-04-13 RX ORDER — ONDANSETRON 2 MG/ML
4 INJECTION INTRAMUSCULAR; INTRAVENOUS ONCE AS NEEDED
Status: DISCONTINUED | OUTPATIENT
Start: 2018-04-13 | End: 2018-04-13 | Stop reason: HOSPADM

## 2018-04-13 RX ORDER — PROPOFOL 10 MG/ML
VIAL (ML) INTRAVENOUS AS NEEDED
Status: DISCONTINUED | OUTPATIENT
Start: 2018-04-13 | End: 2018-04-13 | Stop reason: SURG

## 2018-04-13 RX ADMIN — PROPOFOL 50 MG: 10 INJECTION, EMULSION INTRAVENOUS at 09:52

## 2018-04-13 RX ADMIN — SODIUM CHLORIDE, POTASSIUM CHLORIDE, SODIUM LACTATE AND CALCIUM CHLORIDE 1000 ML: 600; 310; 30; 20 INJECTION, SOLUTION INTRAVENOUS at 07:46

## 2018-04-13 RX ADMIN — PROPOFOL 50 MG: 10 INJECTION, EMULSION INTRAVENOUS at 09:55

## 2018-04-13 RX ADMIN — PROPOFOL 50 MG: 10 INJECTION, EMULSION INTRAVENOUS at 09:47

## 2018-04-13 RX ADMIN — PROPOFOL 100 MG: 10 INJECTION, EMULSION INTRAVENOUS at 10:07

## 2018-04-13 RX ADMIN — PROPOFOL 50 MG: 10 INJECTION, EMULSION INTRAVENOUS at 09:49

## 2018-04-13 RX ADMIN — PROPOFOL 100 MG: 10 INJECTION, EMULSION INTRAVENOUS at 10:00

## 2018-04-13 RX ADMIN — PROPOFOL 70 MG: 10 INJECTION, EMULSION INTRAVENOUS at 09:45

## 2018-04-13 NOTE — ANESTHESIA POSTPROCEDURE EVALUATION
Patient: Danny Arvizu    Procedure Summary     Date:  04/13/18 Room / Location:  [facility] ENDOSCOPY 1 / [facility] ENDOSCOPY    Anesthesia Start:  0945 Anesthesia Stop:  1018    Procedure:  COLONOSCOPY (N/A ) Diagnosis:       Colitis presumed infectious      Chronic diarrhea      (Colitis presumed infectious [A09])      (Chronic diarrhea [K52.9])    Surgeon:  Radha Jeter MD Provider:  Yeison Redding CRNA    Anesthesia Type:  MAC ASA Status:  2          Anesthesia Type: MAC  Last vitals  BP   134/80 (04/13/18 1055)   Temp   98.5 °F (36.9 °C) (04/13/18 1055)   Pulse   73 (04/13/18 1055)   Resp   20 (04/13/18 1055)     SpO2   97 % (04/13/18 1055)     Post Anesthesia Care and Evaluation    Patient location during evaluation: bedside  Patient participation: complete - patient participated  Level of consciousness: sleepy but conscious  Pain score: 0  Pain management: adequate  Airway patency: patent  Anesthetic complications: No anesthetic complications  PONV Status: none  Cardiovascular status: acceptable  Respiratory status: acceptable  Hydration status: acceptable

## 2018-04-13 NOTE — ANESTHESIA PREPROCEDURE EVALUATION
Anesthesia Evaluation     Patient summary reviewed and Nursing notes reviewed   history of anesthetic complications: PONV  NPO Solid Status: > 8 hours  NPO Liquid Status: > 8 hours           Airway   Mallampati: II  TM distance: >3 FB  Neck ROM: full  No difficulty expected  Dental      Pulmonary    Cardiovascular   Exercise tolerance: good (4-7 METS)    Rhythm: regular  Rate: normal    (+) hyperlipidemia,       Neuro/Psych  (+) psychiatric history Anxiety and Depression,     GI/Hepatic/Renal/Endo    (+)  GERD,  hypothyroidism,     Musculoskeletal     Abdominal    Substance History      OB/GYN          Other                        Anesthesia Plan    ASA 2     MAC     intravenous induction   Anesthetic plan and risks discussed with patient.    Plan discussed with CRNA.

## 2018-04-13 NOTE — ANESTHESIA POSTPROCEDURE EVALUATION
Patient: Danny Arvizu    Procedure Summary     Date:  04/13/18 Room / Location:  [facility] ENDOSCOPY 1 / [facility] ENDOSCOPY    Anesthesia Start:  0945 Anesthesia Stop:  1018    Procedure:  COLONOSCOPY (N/A ) Diagnosis:       Colitis presumed infectious      Chronic diarrhea      (Colitis presumed infectious [A09])      (Chronic diarrhea [K52.9])    Surgeon:  Radha Jeter MD Provider:  Yeison Redding CRNA    Anesthesia Type:  MAC ASA Status:  2          Anesthesia Type: MAC  Last vitals  BP   134/80 (04/13/18 1055)   Temp   98.5 °F (36.9 °C) (04/13/18 1055)   Pulse   73 (04/13/18 1055)   Resp   20 (04/13/18 1055)     SpO2   97 % (04/13/18 1055)     Anesthesia Post Evaluation

## 2018-04-25 ENCOUNTER — TELEPHONE (OUTPATIENT)
Dept: SURGERY | Facility: CLINIC | Age: 53
End: 2018-04-25

## 2018-04-25 NOTE — TELEPHONE ENCOUNTER
Patient called stating she was having uncontrolled diarrhea.  She was about to leave on a plane to go out of town and felt her recent diagnosis of C-Diff had returned.  Per Dr. Jeter I called her in Vancomycin 125mg 4x a day for 14 days and Lomotil #10 1 QD no refills.([place])  Patient will call us back when she returns from her trip to make an appt to see Dr. Jeter

## 2018-05-10 ENCOUNTER — TRANSCRIBE ORDERS (OUTPATIENT)
Dept: ADMINISTRATIVE | Facility: HOSPITAL | Age: 53
End: 2018-05-10

## 2018-05-10 DIAGNOSIS — Z12.31 VISIT FOR SCREENING MAMMOGRAM: Primary | ICD-10-CM

## 2018-05-15 RX ORDER — OMEPRAZOLE 20 MG/1
CAPSULE, DELAYED RELEASE ORAL
Qty: 90 CAPSULE | Refills: 3 | Status: SHIPPED | OUTPATIENT
Start: 2018-05-15

## 2018-05-21 ENCOUNTER — HOSPITAL ENCOUNTER (OUTPATIENT)
Dept: MAMMOGRAPHY | Facility: HOSPITAL | Age: 53
Discharge: HOME | End: 2018-05-21
Admitting: OBSTETRICS & GYNECOLOGY

## 2018-05-21 DIAGNOSIS — Z12.31 VISIT FOR SCREENING MAMMOGRAM: ICD-10-CM

## 2018-05-21 PROCEDURE — 77067 SCR MAMMO BI INCL CAD: CPT

## 2018-05-21 PROCEDURE — 77063 BREAST TOMOSYNTHESIS BI: CPT | Performed by: RADIOLOGY

## 2018-05-21 PROCEDURE — 77067 SCR MAMMO BI INCL CAD: CPT | Performed by: RADIOLOGY

## 2018-05-21 PROCEDURE — 77063 BREAST TOMOSYNTHESIS BI: CPT

## 2018-06-18 RX ORDER — SIMVASTATIN 40 MG
TABLET ORAL
Qty: 30 TABLET | Refills: 0 | Status: SHIPPED | OUTPATIENT
Start: 2018-06-18 | End: 2018-07-26 | Stop reason: SDUPTHER

## 2018-07-26 ENCOUNTER — OFFICE VISIT (OUTPATIENT)
Dept: INTERNAL MEDICINE | Facility: CLINIC | Age: 53
End: 2018-07-26

## 2018-07-26 VITALS
TEMPERATURE: 98.3 F | SYSTOLIC BLOOD PRESSURE: 130 MMHG | DIASTOLIC BLOOD PRESSURE: 90 MMHG | HEART RATE: 94 BPM | HEIGHT: 69 IN | WEIGHT: 234 LBS | BODY MASS INDEX: 34.66 KG/M2 | OXYGEN SATURATION: 98 %

## 2018-07-26 DIAGNOSIS — E03.9 ACQUIRED HYPOTHYROIDISM: ICD-10-CM

## 2018-07-26 DIAGNOSIS — K21.9 GASTROESOPHAGEAL REFLUX DISEASE WITHOUT ESOPHAGITIS: ICD-10-CM

## 2018-07-26 DIAGNOSIS — F34.1 DYSTHYMIA: ICD-10-CM

## 2018-07-26 DIAGNOSIS — Z00.00 ROUTINE GENERAL MEDICAL EXAMINATION AT A HEALTH CARE FACILITY: Primary | ICD-10-CM

## 2018-07-26 PROBLEM — K52.9 CHRONIC DIARRHEA: Status: RESOLVED | Noted: 2018-03-12 | Resolved: 2018-07-26

## 2018-07-26 PROBLEM — K52.9 COLITIS PRESUMED INFECTIOUS: Status: RESOLVED | Noted: 2018-03-12 | Resolved: 2018-07-26

## 2018-07-26 PROCEDURE — 99396 PREV VISIT EST AGE 40-64: CPT | Performed by: INTERNAL MEDICINE

## 2018-07-26 RX ORDER — SIMVASTATIN 40 MG
40 TABLET ORAL DAILY
Qty: 90 TABLET | Refills: 3 | Status: SHIPPED | OUTPATIENT
Start: 2018-07-26

## 2018-07-26 NOTE — PROGRESS NOTES
Chief Complaint   Patient presents with   • Annual Exam       Danny Arvizu is a 52 y.o. female and is here for a comprehensive physical exam. The patient reports problems - hypothyroidism,gerd, hyperlipidemia.     History:  LMP: No LMP recorded. Patient has had an ablation.  Menopause at 41 years  Last pap date: 2018  Abnormal pap? no  : 2  Para: 1    Do you take any herbs or supplements that were not prescribed by a doctor? no  Are you taking calcium supplements? no  Are you taking aspirin daily? no      Health Habits:  Dental Exam. up to date  Eye Exam. up to date  Exercise: 0 times/week.  Current exercise activities include: none    Health Maintenance   Topic Date Due   • ANNUAL PHYSICAL  1966   • TDAP/TD VACCINES (1 - Tdap) 1982   • ZOSTER VACCINE (1 of 2) 2013   • HEPATITIS C SCREENING  2016   • LIPID PANEL  2018   • INFLUENZA VACCINE  2018   • MAMMOGRAM  2020   • PAP SMEAR  2021   • COLONOSCOPY  2028       PMH, PSH, SocHx, FamHx, Allergies, and Medications: Reviewed and updated in the Visit Navigator.     Allergies   Allergen Reactions   • Demerol [Meperidine] Nausea And Vomiting   • Tetracyclines & Related Nausea And Vomiting     Past Medical History:   Diagnosis Date   • Anxiety and depression    • Body piercing     EARS   • C. difficile diarrhea 2018    REPORTS WAS TREATED   • Disease of thyroid gland    • High cholesterol    • History of pneumonia    • PONV (postoperative nausea and vomiting)    • Seasonal allergies    • Sinus problem    • Wears glasses      Past Surgical History:   Procedure Laterality Date   • COLONOSCOPY N/A 2018    Procedure: COLONOSCOPY;  Surgeon: Radha Jeter MD;  Location: [facility] ENDOSCOPY;  Service: Gastroenterology   • DILATATION AND CURETTAGE     • FINGER SURGERY Right     INDEX FINGER   • FOOT SURGERY Left    • HYSTEROSCOPY ENDOMETRIAL ABLATION     • OTHER SURGICAL HISTORY Left     REPORTS HAD A  BIRTH JULIANNE REMOVED FROM LEFT ARM AS A TEENAGER     Social History     Social History   • Marital status:      Spouse name: N/A   • Number of children: N/A   • Years of education: N/A     Occupational History   • Not on file.     Social History Main Topics   • Smoking status: Never Smoker   • Smokeless tobacco: Never Used   • Alcohol use Yes      Comment: REPORTS SOCIAL USE ONLY, NO HX OF ABUSE   • Drug use: No   • Sexual activity: Defer     Other Topics Concern   • Not on file     Social History Narrative   • No narrative on file     Family History   Problem Relation Age of Onset   • Heart attack Father    • Hypertension Father    • Heart attack Other         Grandmother & Uncles   • Cancer Other         Grandmother   • Hypertension Other         Grandparents   • Breast cancer Cousin 35   • Ovarian cancer Neg Hx        Review of Systems  Review of Systems   Constitutional: Negative.  Negative for activity change, appetite change, fatigue and fever.   HENT: Negative for congestion, ear discharge, ear pain and trouble swallowing.    Eyes: Negative for photophobia and visual disturbance.   Respiratory: Negative for cough and shortness of breath.    Cardiovascular: Negative for chest pain and palpitations.   Gastrointestinal: Negative for abdominal distention, abdominal pain, constipation, diarrhea, nausea and vomiting.   Endocrine: Negative.    Genitourinary: Negative for dysuria, hematuria and urgency.   Musculoskeletal: Positive for arthralgias. Negative for back pain, joint swelling and myalgias.   Skin: Negative for color change and rash.   Allergic/Immunologic: Negative.    Neurological: Negative for dizziness, weakness, light-headedness and headaches.   Hematological: Negative for adenopathy. Does not bruise/bleed easily.   Psychiatric/Behavioral: Negative for agitation, confusion and dysphoric mood. The patient is not nervous/anxious.        Vitals:    07/26/18 1402   BP: 130/90   Pulse: 94   Temp: 98.3  "°F (36.8 °C)   SpO2: 98%       Objective   /90   Pulse 94   Temp 98.3 °F (36.8 °C)   Ht 175.3 cm (69\")   Wt 106 kg (234 lb)   SpO2 98%   BMI 34.56 kg/m²     Physical Exam   Constitutional: She is oriented to person, place, and time. She appears well-developed and well-nourished. No distress.   HENT:   Nose: Nose normal.   Mouth/Throat: Oropharynx is clear and moist.   Eyes: Conjunctivae and EOM are normal. No scleral icterus.   Neck: No tracheal deviation present. No thyromegaly present.   Cardiovascular: Normal rate and regular rhythm.  Exam reveals no friction rub.    No murmur heard.  Pulmonary/Chest: No respiratory distress. She has no wheezes. She has no rales.   Abdominal: Soft. She exhibits no distension and no mass. There is no tenderness. There is no guarding.   Musculoskeletal: Normal range of motion. She exhibits no deformity.   Lymphadenopathy:     She has no cervical adenopathy.   Neurological: She is alert and oriented to person, place, and time. She has normal reflexes. No cranial nerve deficit. Coordination normal.   Skin: Skin is warm and dry. No rash noted. No erythema.   Psychiatric: She has a normal mood and affect. Her behavior is normal. Judgment and thought content normal.        Assessment/Plan   1. Healthy female exam.   2. Patient Counseling: Including but not Limited to the following, when appropriate:  --Nutrition: Stressed importance of moderation in sodium/caffeine intake, saturated fat and cholesterol, caloric balance, sufficient intake of fresh fruits, vegetables, fiber, calcium, iron  --Discussed the issue of estrogen replacement, calcium supplement, and the daily use of baby aspirin.  --Exercise: Stressed the importance of regular exercise.   --Substance Abuse: Discussed cessation/primary prevention of tobacco, alcohol, or other drug use; driving or other dangerous activities under the influence; availability of treatment for abuse, as indicated based on social history. "    --Sexuality: Discussed sexually transmitted diseases, partner selection, use of condoms, avoidance of unintended pregnancy  and contraceptive alternatives.   --Injury prevention: Discussed safety belts, safety helmets, smoke detector, smoking near bedding or upholstery.   --Dental health: Discussed importance of regular tooth brushing, flossing, and dental visits.  --Immunizations reviewed.  --Discussed benefits of colon cancer screening.      3. Discussed the patient's BMI with her.  The BMI is above average; BMI management plan is completed  4. No Follow-up on file.  5. Age-appropriate Screening Scheduled  6. There are no Patient Instructions on file for this visit.    Assessment/Plan     Danny was seen today for annual exam.    Diagnoses and all orders for this visit:    Routine general medical examination at a health care facility    Acquired hypothyroidism. Clinically euthyroid follow TSH.     DysthymiaStable on Zoloft. Discussed getting off the medication however she feels she needs it currently    Gastroesophageal reflux disease without esophagitis. Stable

## 2018-11-02 RX ORDER — LEVOTHYROXINE SODIUM 0.03 MG/1
TABLET ORAL
Qty: 90 TABLET | Refills: 3 | Status: SHIPPED | OUTPATIENT
Start: 2018-11-02 | End: 2021-06-23

## 2019-06-12 ENCOUNTER — TRANSCRIBE ORDERS (OUTPATIENT)
Dept: ADMINISTRATIVE | Facility: HOSPITAL | Age: 54
End: 2019-06-12

## 2019-06-12 DIAGNOSIS — Z12.31 VISIT FOR SCREENING MAMMOGRAM: Primary | ICD-10-CM

## 2019-07-01 ENCOUNTER — TRANSCRIBE ORDERS (OUTPATIENT)
Dept: ADMINISTRATIVE | Facility: HOSPITAL | Age: 54
End: 2019-07-01

## 2019-07-01 ENCOUNTER — APPOINTMENT (OUTPATIENT)
Dept: LAB | Facility: HOSPITAL | Age: 54
End: 2019-07-01

## 2019-07-01 DIAGNOSIS — E03.4 IDIOPATHIC ATROPHIC HYPOTHYROIDISM: Primary | ICD-10-CM

## 2019-07-01 LAB
T4 FREE SERPL-MCNC: 1.01 NG/DL (ref 0.78–2.19)
TSH SERPL DL<=0.05 MIU/L-ACNC: 2.83 MIU/ML (ref 0.47–4.68)

## 2019-07-01 PROCEDURE — 84439 ASSAY OF FREE THYROXINE: CPT | Performed by: FAMILY MEDICINE

## 2019-07-01 PROCEDURE — 36415 COLL VENOUS BLD VENIPUNCTURE: CPT | Performed by: FAMILY MEDICINE

## 2019-07-01 PROCEDURE — 84443 ASSAY THYROID STIM HORMONE: CPT | Performed by: FAMILY MEDICINE

## 2019-07-22 ENCOUNTER — HOSPITAL ENCOUNTER (OUTPATIENT)
Dept: MAMMOGRAPHY | Facility: HOSPITAL | Age: 54
Discharge: HOME | End: 2019-07-22
Admitting: OBSTETRICS & GYNECOLOGY

## 2019-07-22 DIAGNOSIS — Z12.31 VISIT FOR SCREENING MAMMOGRAM: ICD-10-CM

## 2019-07-22 PROCEDURE — 77067 SCR MAMMO BI INCL CAD: CPT

## 2019-07-22 PROCEDURE — 77063 BREAST TOMOSYNTHESIS BI: CPT | Performed by: RADIOLOGY

## 2019-07-22 PROCEDURE — 77063 BREAST TOMOSYNTHESIS BI: CPT

## 2019-07-22 PROCEDURE — 77067 SCR MAMMO BI INCL CAD: CPT | Performed by: RADIOLOGY

## 2019-11-08 ENCOUNTER — TRANSCRIBE ORDERS (OUTPATIENT)
Dept: LAB | Facility: HOSPITAL | Age: 54
End: 2019-11-08

## 2019-11-08 ENCOUNTER — LAB (OUTPATIENT)
Dept: LAB | Facility: HOSPITAL | Age: 54
End: 2019-11-08

## 2019-11-08 DIAGNOSIS — E03.4 IDIOPATHIC ATROPHIC HYPOTHYROIDISM: ICD-10-CM

## 2019-11-08 DIAGNOSIS — E78.2 MIXED HYPERLIPIDEMIA: ICD-10-CM

## 2019-11-08 DIAGNOSIS — R73.03 DIABETES MELLITUS, LATENT: ICD-10-CM

## 2019-11-08 DIAGNOSIS — E78.2 MIXED HYPERLIPIDEMIA: Primary | ICD-10-CM

## 2019-11-08 LAB
ALBUMIN SERPL-MCNC: 4.3 G/DL (ref 3.5–5.2)
ALBUMIN/GLOB SERPL: 1.2 G/DL
ALP SERPL-CCNC: 95 U/L (ref 39–117)
ALT SERPL W P-5'-P-CCNC: 26 U/L (ref 1–33)
ANION GAP SERPL CALCULATED.3IONS-SCNC: 10.2 MMOL/L (ref 5–15)
AST SERPL-CCNC: 15 U/L (ref 1–32)
BILIRUB SERPL-MCNC: 0.2 MG/DL (ref 0.2–1.2)
BUN BLD-MCNC: 13 MG/DL (ref 6–20)
BUN/CREAT SERPL: 14.8 (ref 7–25)
CALCIUM SPEC-SCNC: 9.3 MG/DL (ref 8.6–10.5)
CHLORIDE SERPL-SCNC: 99 MMOL/L (ref 98–107)
CO2 SERPL-SCNC: 26.8 MMOL/L (ref 22–29)
CREAT BLD-MCNC: 0.88 MG/DL (ref 0.57–1)
GFR SERPL CREATININE-BSD FRML MDRD: 66 ML/MIN/1.73
GLOBULIN UR ELPH-MCNC: 3.5 GM/DL
GLUCOSE BLD-MCNC: 109 MG/DL (ref 65–99)
HBA1C MFR BLD: 6 % (ref 4.8–5.6)
POTASSIUM BLD-SCNC: 4 MMOL/L (ref 3.5–5.2)
PROT SERPL-MCNC: 7.8 G/DL (ref 6–8.5)
SODIUM BLD-SCNC: 136 MMOL/L (ref 136–145)
T4 FREE SERPL-MCNC: 1.25 NG/DL (ref 0.93–1.7)
TSH SERPL DL<=0.05 MIU/L-ACNC: 1.57 UIU/ML (ref 0.27–4.2)

## 2019-11-08 PROCEDURE — 83036 HEMOGLOBIN GLYCOSYLATED A1C: CPT

## 2019-11-08 PROCEDURE — 36415 COLL VENOUS BLD VENIPUNCTURE: CPT

## 2019-11-08 PROCEDURE — 84439 ASSAY OF FREE THYROXINE: CPT

## 2019-11-08 PROCEDURE — 80053 COMPREHEN METABOLIC PANEL: CPT

## 2019-11-08 PROCEDURE — 84443 ASSAY THYROID STIM HORMONE: CPT

## 2019-11-19 ENCOUNTER — OFFICE VISIT (OUTPATIENT)
Dept: PULMONOLOGY | Facility: CLINIC | Age: 54
End: 2019-11-19

## 2019-11-19 VITALS
DIASTOLIC BLOOD PRESSURE: 76 MMHG | HEART RATE: 91 BPM | HEIGHT: 69 IN | BODY MASS INDEX: 36.58 KG/M2 | SYSTOLIC BLOOD PRESSURE: 128 MMHG | RESPIRATION RATE: 16 BRPM | WEIGHT: 247 LBS | OXYGEN SATURATION: 96 %

## 2019-11-19 DIAGNOSIS — E66.9 OBESITY (BMI 30-39.9): ICD-10-CM

## 2019-11-19 DIAGNOSIS — F51.5 NIGHTMARES: ICD-10-CM

## 2019-11-19 DIAGNOSIS — R06.83 SNORING: ICD-10-CM

## 2019-11-19 DIAGNOSIS — G47.33 OBSTRUCTIVE SLEEP APNEA: Primary | ICD-10-CM

## 2019-11-19 DIAGNOSIS — G47.19 EXCESSIVE DAYTIME SLEEPINESS: ICD-10-CM

## 2019-11-19 PROCEDURE — 99244 OFF/OP CNSLTJ NEW/EST MOD 40: CPT | Performed by: INTERNAL MEDICINE

## 2019-11-19 RX ORDER — PRAVASTATIN SODIUM 40 MG
40 TABLET ORAL
Refills: 1 | COMMUNITY
Start: 2019-10-28 | End: 2021-10-25 | Stop reason: SDUPTHER

## 2019-11-19 RX ORDER — LEVOTHYROXINE SODIUM 0.07 MG/1
75 TABLET ORAL DAILY
Refills: 2 | COMMUNITY
Start: 2019-11-01

## 2019-11-19 RX ORDER — FLUTICASONE PROPIONATE 50 MCG
SPRAY, SUSPENSION (ML) NASAL
COMMUNITY
End: 2021-10-25 | Stop reason: SDUPTHER

## 2019-11-19 NOTE — PROGRESS NOTES
CONSULT NOTE    Requested by:   Navya Burns MD      Chief Complaint   Patient presents with   • Consult   • Sleeping Problem       Subjective:  Danny Arvizu is a 54 y.o. female.     History of Present Illness   Patient was sent in today for evaluation of sleep disturbance. Patient says that for the past few years, she has had trouble with snoring. Patient has also been told that she sometimes quits breathing at night.       Patient says that she feels somewhat tired in the morning after waking up. she is also complaining of occasionally feeling sleepy while watching TV and sometimes while reading a book.    Patient also mentions occasional nights when she remembers having nightmares and has woken up due to the same.      she is complaining of occasional headaches.    Patient's sleep schedule was reviewed. she drinks 2-3 cups/cans of caffeinated drinks per day.     she does not have a family history of GINA.     Patient's Epsworth Sleepiness score was 9/24.    The following portions of the patient's history were reviewed and updated as appropriate: allergies, current medications, past family history, past medical history, past social history and past surgical history.    Review of Systems   Constitutional: Negative for chills, fatigue and fever.   HENT: Positive for sinus pressure and sneezing. Negative for sore throat.    Respiratory: Positive for cough and wheezing. Negative for chest tightness and shortness of breath.    Cardiovascular: Negative for palpitations and leg swelling.   Psychiatric/Behavioral: Positive for sleep disturbance.   All other systems reviewed and are negative.      Past Medical History:   Diagnosis Date   • Anxiety and depression    • Body piercing     EARS   • C. difficile diarrhea 02/21/2018    REPORTS WAS TREATED   • Disease of thyroid gland    • High cholesterol    • History of pneumonia    • PONV (postoperative nausea and vomiting)    • Seasonal allergies    • Sinus problem    •  "Wears glasses        Social History     Tobacco Use   • Smoking status: Never Smoker   • Smokeless tobacco: Never Used   Substance Use Topics   • Alcohol use: Yes     Comment: REPORTS SOCIAL USE ONLY, NO HX OF ABUSE         Objective:  Visit Vitals  /76   Pulse 91   Resp 16   Ht 175.3 cm (69.02\")   Wt 112 kg (247 lb)   SpO2 96%   BMI 36.46 kg/m²       Physical Exam   Constitutional: She is oriented to person, place, and time. She appears well-developed and well-nourished.   HENT:   Head: Atraumatic.   Crowded Oropharynx.    Eyes: EOM are normal. Pupils are equal, round, and reactive to light.   Neck: No JVD present. No tracheal deviation present. No thyromegaly present.   Increased adipose tissue.    Cardiovascular: Normal rate and regular rhythm.   Pulmonary/Chest: Effort normal and breath sounds normal. No respiratory distress. She has no wheezes.   Musculoskeletal: Normal range of motion. She exhibits no edema.   Gait was normal.   Neurological: She is alert and oriented to person, place, and time.   Skin: Skin is warm and dry.   Psychiatric: She has a normal mood and affect. Her behavior is normal.   Vitals reviewed.      Assessment/Plan:  Danny was seen today for consult and sleeping problem.    Diagnoses and all orders for this visit:    Obstructive sleep apnea  -     Home Sleep Study; Future    Snoring  -     Home Sleep Study; Future    Excessive daytime sleepiness  -     Home Sleep Study; Future    Obesity (BMI 30-39.9)  -     Home Sleep Study; Future    Nightmares  -     Home Sleep Study; Future        Return in about 3 months (around 2/19/2020) for SleepONJENNIFER/Genie, ....Also 8 mths w/ Dr. Calderon.    DISCUSSION(if any):  Laboratory workup was also reviewed which showed   Lab Results   Component Value Date    CO2 26.8 11/08/2019     ===========================  ===========================    Sleep questionnaire was reviewed with the patient    The pathophysiology of sleep apnea was discussed, with the " patient.     We will encourage her to schedule the sleep study soon.     The patient was made aware of the limitation of the home sleep study, whereby it may underestimate the true AHI and also carries a low sensitivity.  I have informed her that even if the home sleep study is negative, we may suggest an in lab sleep study to completely and definitively rule out/in sleep apnea.  The patient has understood.  This was communicated to the patient, in case home study is to be requested.    The patient is agreeable to try CPAP/BiPAP, if needed.     Patient was educated on good sleep hygiene measures and voiced understanding of the same.     Patient was given reading material regarding sleep apnea    Patient was counseled regarding weight loss.       Dictated utilizing Dragon dictation.    This document was electronically signed by Jef Calderon MD on 11/19/19 at 1:31 PM

## 2019-11-25 ENCOUNTER — HOSPITAL ENCOUNTER (OUTPATIENT)
Dept: SLEEP MEDICINE | Facility: HOSPITAL | Age: 54
Discharge: HOME | End: 2019-11-25
Admitting: INTERNAL MEDICINE

## 2019-11-25 DIAGNOSIS — F51.5 NIGHTMARES: ICD-10-CM

## 2019-11-25 DIAGNOSIS — R06.83 SNORING: ICD-10-CM

## 2019-11-25 DIAGNOSIS — G47.33 OBSTRUCTIVE SLEEP APNEA: ICD-10-CM

## 2019-11-25 DIAGNOSIS — E66.9 OBESITY (BMI 30-39.9): ICD-10-CM

## 2019-11-25 DIAGNOSIS — G47.19 EXCESSIVE DAYTIME SLEEPINESS: ICD-10-CM

## 2019-11-25 PROCEDURE — 95806 SLEEP STUDY UNATT&RESP EFFT: CPT

## 2019-11-25 PROCEDURE — 95806 SLEEP STUDY UNATT&RESP EFFT: CPT | Performed by: INTERNAL MEDICINE

## 2019-12-04 DIAGNOSIS — G47.33 OSA (OBSTRUCTIVE SLEEP APNEA): Primary | ICD-10-CM

## 2020-02-19 ENCOUNTER — OFFICE VISIT (OUTPATIENT)
Dept: PULMONOLOGY | Facility: CLINIC | Age: 55
End: 2020-02-19

## 2020-02-19 VITALS
BODY MASS INDEX: 37.18 KG/M2 | DIASTOLIC BLOOD PRESSURE: 62 MMHG | HEIGHT: 69 IN | RESPIRATION RATE: 18 BRPM | WEIGHT: 251 LBS | OXYGEN SATURATION: 98 % | SYSTOLIC BLOOD PRESSURE: 104 MMHG | HEART RATE: 86 BPM

## 2020-02-19 DIAGNOSIS — E66.9 OBESITY (BMI 30-39.9): ICD-10-CM

## 2020-02-19 DIAGNOSIS — G47.33 OSA (OBSTRUCTIVE SLEEP APNEA): Primary | ICD-10-CM

## 2020-02-19 DIAGNOSIS — G47.19 EXCESSIVE DAYTIME SLEEPINESS: ICD-10-CM

## 2020-02-19 PROCEDURE — 99213 OFFICE O/P EST LOW 20 MIN: CPT | Performed by: NURSE PRACTITIONER

## 2020-02-19 NOTE — PROGRESS NOTES
"Chief Complaint   Patient presents with   • Follow-up   • Sleeping Problem         Subjective   Lasonia PARVIN Arvizu is a 54 y.o. female.     History of Present Illness   The patient comes in today for follow-up of obstructive sleep apnea.    I reviewed her sleep study and I discussed the results with her today.  The sleep study revealed severe sleep apnea with an apnea hypopnea index of 77 per hour.  Her apnea-hypopnea index was worse in supine position.    She been set up with AutoPAP at a pressure of 8/15.  she is not having any issues using the machine.  She is no longer napping during the day and she feels rested upon awakening most mornings.    The following portions of the patient's history were reviewed and updated as appropriate: allergies, current medications, past family history, past medical history, past social history and past surgical history.    Review of Systems   HENT: Negative for rhinorrhea, sinus pressure, sneezing and sore throat.    Respiratory: Positive for wheezing. Negative for cough and shortness of breath.    Skin: Positive for rash (Pt has small red spots on her left arm).       Objective   Visit Vitals  /62   Pulse 86   Resp 18   Ht 175.3 cm (69.02\")   Wt 114 kg (251 lb)   SpO2 98%   BMI 37.04 kg/m²     Physical Exam   Constitutional: She is oriented to person, place, and time. She appears well-developed and well-nourished.   HENT:   Head: Normocephalic and atraumatic.   Crowded oropharynx.    Abdominal:   Obese.   Musculoskeletal:   Gait was normal.   Neurological: She is alert and oriented to person, place, and time.   Psychiatric: She has a normal mood and affect.   Vitals reviewed.          Assessment/Plan   Danny was seen today for follow-up and sleeping problem.    Diagnoses and all orders for this visit:    GINA (obstructive sleep apnea)    Excessive daytime sleepiness    Obesity (BMI 30-39.9)           Return for keep appt in July with Doc.    DISCUSSION (if any):  Continue " treatment with AutoPAP at a pressure of 8/15, with a nasal mask.    Patient's compliance data was reviewed and the compliance is 100%.    Humidification setup, hose and mask care discussed.    Weight loss advised.    Use every night for at least 4 hours stressed.      Dictated utilizing Dragon dictation.    This document was electronically signed by BRAIN Lacy February 19, 2020  2:13 PM

## 2020-07-07 ENCOUNTER — TRANSCRIBE ORDERS (OUTPATIENT)
Dept: ADMINISTRATIVE | Facility: HOSPITAL | Age: 55
End: 2020-07-07

## 2020-07-07 DIAGNOSIS — Z12.31 VISIT FOR SCREENING MAMMOGRAM: Primary | ICD-10-CM

## 2020-07-21 ENCOUNTER — OFFICE VISIT (OUTPATIENT)
Dept: PULMONOLOGY | Facility: CLINIC | Age: 55
End: 2020-07-21

## 2020-07-21 VITALS
DIASTOLIC BLOOD PRESSURE: 70 MMHG | HEART RATE: 104 BPM | WEIGHT: 264 LBS | SYSTOLIC BLOOD PRESSURE: 112 MMHG | OXYGEN SATURATION: 96 % | HEIGHT: 69 IN | BODY MASS INDEX: 39.1 KG/M2

## 2020-07-21 DIAGNOSIS — E66.9 OBESITY (BMI 30-39.9): ICD-10-CM

## 2020-07-21 DIAGNOSIS — G47.33 OSA (OBSTRUCTIVE SLEEP APNEA): Primary | ICD-10-CM

## 2020-07-21 PROCEDURE — 99213 OFFICE O/P EST LOW 20 MIN: CPT | Performed by: INTERNAL MEDICINE

## 2020-07-21 NOTE — PROGRESS NOTES
"Chief Complaint   Patient presents with   • Follow-up   • Sleeping Problem       Subjective   Lassincere Arvizu is a 54 y.o. female.     History of Present Illness   Patient was evaluated today for follow up of Sleep apnea.     Patient doesn't report any issues with the device. The patient describes no significant issues with her mask either.     Patient says that the compliance with the use of the equipment is good.     Patient says that her symptoms of fatigue & daytime sleepiness have been helped greatly with the use of PAP, as prescribed.       The following portions of the patient's history were reviewed and updated as appropriate: allergies, current medications, past family history, past medical history, past social history and past surgical history.    Review of Systems   Respiratory: Negative for cough, shortness of breath and wheezing.    Cardiovascular: Negative for chest pain, palpitations and leg swelling.   Psychiatric/Behavioral: Positive for sleep disturbance.       Objective   Visit Vitals  /70 (BP Location: Left arm, Patient Position: Sitting, Cuff Size: Large Adult)   Pulse 104   Ht 175.3 cm (69.02\")   Wt 120 kg (264 lb)   SpO2 96%   BMI 38.96 kg/m²       Physical Exam   Constitutional: She is oriented to person, place, and time. She appears well-developed and well-nourished.   HENT:   Head: Atraumatic.   Crowded oropharynx.   Fillings noted.    Neck:   Increased adipose tissue.    Musculoskeletal:   Gait was normal.   Neurological: She is alert and oriented to person, place, and time.   Psychiatric: She has a normal mood and affect.   Vitals reviewed.      Assessment/Plan   Danny was seen today for follow-up and sleeping problem.    Diagnoses and all orders for this visit:    GINA (obstructive sleep apnea)    Obesity (BMI 30-39.9)         Return in about 11 months (around 6/21/2021) for SleepONLY/Genie.    DISCUSSION (if any):  I reviewed the results of last sleep study in detail. I " informed her that the apnea hypopnea index was 77/ hr. This was a home study. This was done in Nov 2019.    Continue treatment with AutoPAP at a pressure of 8/15, with nasal pillows.    Patient seems to be compliant with PAP device, based on the available data and her account of improved symptoms.     Weight loss advised.    The patient was once again reminded to continue using the PAP device regularly, every night for atleast 4 hours.    I spent a total of 20 minutes face to face with this patient. her  pertinent current and previous data, as applicable, were reviewed. Patient's diagnostic studies were also reviewed. More than 10 minutes of the time spent, was spent in counseling about patient's underlying disease process, reviewing any available sleep studies, need to use devices (as applicable) on a regular basis and lifestyle modifications that may positively impact patient's health.       Dictated utilizing Dragon dictation.    This document was electronically signed by Jef Calderon MD on 07/21/20 at 13:29

## 2020-07-28 ENCOUNTER — TRANSCRIBE ORDERS (OUTPATIENT)
Dept: LAB | Facility: HOSPITAL | Age: 55
End: 2020-07-28

## 2020-07-28 ENCOUNTER — LAB (OUTPATIENT)
Dept: LAB | Facility: HOSPITAL | Age: 55
End: 2020-07-28

## 2020-07-28 DIAGNOSIS — E03.8 HYPOTHYROIDISM DUE TO FIBROUS INVASIVE THYROIDITIS: ICD-10-CM

## 2020-07-28 DIAGNOSIS — Z00.00 ROUTINE GENERAL MEDICAL EXAMINATION AT A HEALTH CARE FACILITY: ICD-10-CM

## 2020-07-28 DIAGNOSIS — E55.9 VITAMIN D DEFICIENCY: ICD-10-CM

## 2020-07-28 DIAGNOSIS — E06.5 HYPOTHYROIDISM DUE TO FIBROUS INVASIVE THYROIDITIS: ICD-10-CM

## 2020-07-28 DIAGNOSIS — Z00.00 ROUTINE GENERAL MEDICAL EXAMINATION AT A HEALTH CARE FACILITY: Primary | ICD-10-CM

## 2020-07-28 DIAGNOSIS — R73.9 BLOOD GLUCOSE ELEVATED: ICD-10-CM

## 2020-07-28 LAB
25(OH)D3 SERPL-MCNC: 43.4 NG/ML (ref 30–100)
ALBUMIN SERPL-MCNC: 4.2 G/DL (ref 3.5–5.2)
ALBUMIN/GLOB SERPL: 1.3 G/DL
ALP SERPL-CCNC: 82 U/L (ref 39–117)
ALT SERPL W P-5'-P-CCNC: 33 U/L (ref 1–33)
ANION GAP SERPL CALCULATED.3IONS-SCNC: 11.7 MMOL/L (ref 5–15)
AST SERPL-CCNC: 23 U/L (ref 1–32)
BILIRUB SERPL-MCNC: 0.3 MG/DL (ref 0–1.2)
BUN SERPL-MCNC: 14 MG/DL (ref 6–20)
BUN/CREAT SERPL: 15.6 (ref 7–25)
CALCIUM SPEC-SCNC: 9.5 MG/DL (ref 8.6–10.5)
CHLORIDE SERPL-SCNC: 100 MMOL/L (ref 98–107)
CHOLEST SERPL-MCNC: 197 MG/DL (ref 0–200)
CO2 SERPL-SCNC: 26.3 MMOL/L (ref 22–29)
CREAT SERPL-MCNC: 0.9 MG/DL (ref 0.57–1)
GFR SERPL CREATININE-BSD FRML MDRD: 65 ML/MIN/1.73
GLOBULIN UR ELPH-MCNC: 3.3 GM/DL
GLUCOSE SERPL-MCNC: 109 MG/DL (ref 65–99)
HBA1C MFR BLD: 6.3 % (ref 4.8–5.6)
HDLC SERPL-MCNC: 36 MG/DL (ref 40–60)
LDLC SERPL CALC-MCNC: 116 MG/DL (ref 0–100)
LDLC/HDLC SERPL: 3.21 {RATIO}
POTASSIUM SERPL-SCNC: 4.4 MMOL/L (ref 3.5–5.2)
PROT SERPL-MCNC: 7.5 G/DL (ref 6–8.5)
SODIUM SERPL-SCNC: 138 MMOL/L (ref 136–145)
T4 FREE SERPL-MCNC: 1.17 NG/DL (ref 0.93–1.7)
TRIGL SERPL-MCNC: 227 MG/DL (ref 0–150)
TSH SERPL DL<=0.05 MIU/L-ACNC: 1.54 UIU/ML (ref 0.27–4.2)
VLDLC SERPL-MCNC: 45.4 MG/DL (ref 5–40)

## 2020-07-28 PROCEDURE — 84439 ASSAY OF FREE THYROXINE: CPT

## 2020-07-28 PROCEDURE — 84443 ASSAY THYROID STIM HORMONE: CPT

## 2020-07-28 PROCEDURE — 36415 COLL VENOUS BLD VENIPUNCTURE: CPT

## 2020-07-28 PROCEDURE — 82306 VITAMIN D 25 HYDROXY: CPT

## 2020-07-28 PROCEDURE — 80053 COMPREHEN METABOLIC PANEL: CPT

## 2020-07-28 PROCEDURE — 83036 HEMOGLOBIN GLYCOSYLATED A1C: CPT

## 2020-07-28 PROCEDURE — 80061 LIPID PANEL: CPT

## 2020-08-12 ENCOUNTER — HOSPITAL ENCOUNTER (OUTPATIENT)
Dept: MAMMOGRAPHY | Facility: HOSPITAL | Age: 55
Discharge: HOME | End: 2020-08-12
Admitting: OBSTETRICS & GYNECOLOGY

## 2020-08-12 DIAGNOSIS — Z12.31 VISIT FOR SCREENING MAMMOGRAM: ICD-10-CM

## 2020-08-12 PROCEDURE — 77063 BREAST TOMOSYNTHESIS BI: CPT

## 2020-08-12 PROCEDURE — 77067 SCR MAMMO BI INCL CAD: CPT

## 2021-04-19 ENCOUNTER — TRANSCRIBE ORDERS (OUTPATIENT)
Dept: LAB | Facility: HOSPITAL | Age: 56
End: 2021-04-19

## 2021-04-19 ENCOUNTER — LAB (OUTPATIENT)
Dept: LAB | Facility: HOSPITAL | Age: 56
End: 2021-04-19

## 2021-04-19 DIAGNOSIS — R59.9 SWELLING OF LYMPH NODES: ICD-10-CM

## 2021-04-19 DIAGNOSIS — R73.03 DIABETES MELLITUS, LATENT: ICD-10-CM

## 2021-04-19 DIAGNOSIS — E06.5 HYPOTHYROIDISM DUE TO FIBROUS INVASIVE THYROIDITIS: ICD-10-CM

## 2021-04-19 DIAGNOSIS — E03.8 HYPOTHYROIDISM DUE TO FIBROUS INVASIVE THYROIDITIS: ICD-10-CM

## 2021-04-19 DIAGNOSIS — E78.2 MIXED HYPERLIPIDEMIA: Primary | ICD-10-CM

## 2021-04-19 DIAGNOSIS — E78.2 MIXED HYPERLIPIDEMIA: ICD-10-CM

## 2021-04-19 LAB
ALBUMIN SERPL-MCNC: 4.1 G/DL (ref 3.5–5.2)
ALBUMIN/GLOB SERPL: 1.1 G/DL
ALP SERPL-CCNC: 102 U/L (ref 39–117)
ALT SERPL W P-5'-P-CCNC: 17 U/L (ref 1–33)
ANION GAP SERPL CALCULATED.3IONS-SCNC: 13.5 MMOL/L (ref 5–15)
AST SERPL-CCNC: 16 U/L (ref 1–32)
BASOPHILS # BLD AUTO: 0.1 10*3/MM3 (ref 0–0.2)
BASOPHILS NFR BLD AUTO: 1.3 % (ref 0–1.5)
BILIRUB SERPL-MCNC: 0.3 MG/DL (ref 0–1.2)
BUN SERPL-MCNC: 13 MG/DL (ref 6–20)
BUN/CREAT SERPL: 14 (ref 7–25)
CALCIUM SPEC-SCNC: 9.4 MG/DL (ref 8.6–10.5)
CHLORIDE SERPL-SCNC: 101 MMOL/L (ref 98–107)
CO2 SERPL-SCNC: 26.5 MMOL/L (ref 22–29)
CREAT SERPL-MCNC: 0.93 MG/DL (ref 0.57–1)
CRP SERPL-MCNC: 3.36 MG/DL (ref 0–0.5)
DEPRECATED RDW RBC AUTO: 38.4 FL (ref 37–54)
EOSINOPHIL # BLD AUTO: 0.29 10*3/MM3 (ref 0–0.4)
EOSINOPHIL NFR BLD AUTO: 3.7 % (ref 0.3–6.2)
ERYTHROCYTE [DISTWIDTH] IN BLOOD BY AUTOMATED COUNT: 13.4 % (ref 12.3–15.4)
ERYTHROCYTE [SEDIMENTATION RATE] IN BLOOD: 60 MM/HR (ref 0–30)
GFR SERPL CREATININE-BSD FRML MDRD: 62 ML/MIN/1.73
GLOBULIN UR ELPH-MCNC: 3.9 GM/DL
GLUCOSE SERPL-MCNC: 103 MG/DL (ref 65–99)
HBA1C MFR BLD: 6.32 % (ref 4.8–5.6)
HCT VFR BLD AUTO: 39.7 % (ref 34–46.6)
HGB BLD-MCNC: 12.7 G/DL (ref 12–15.9)
IMM GRANULOCYTES # BLD AUTO: 0.03 10*3/MM3 (ref 0–0.05)
IMM GRANULOCYTES NFR BLD AUTO: 0.4 % (ref 0–0.5)
LDH SERPL-CCNC: 170 U/L (ref 135–214)
LYMPHOCYTES # BLD AUTO: 2.3 10*3/MM3 (ref 0.7–3.1)
LYMPHOCYTES NFR BLD AUTO: 29.7 % (ref 19.6–45.3)
MCH RBC QN AUTO: 26 PG (ref 26.6–33)
MCHC RBC AUTO-ENTMCNC: 32 G/DL (ref 31.5–35.7)
MCV RBC AUTO: 81.2 FL (ref 79–97)
MONOCYTES # BLD AUTO: 0.71 10*3/MM3 (ref 0.1–0.9)
MONOCYTES NFR BLD AUTO: 9.2 % (ref 5–12)
NEUTROPHILS NFR BLD AUTO: 4.31 10*3/MM3 (ref 1.7–7)
NEUTROPHILS NFR BLD AUTO: 55.7 % (ref 42.7–76)
NRBC BLD AUTO-RTO: 0 /100 WBC (ref 0–0.2)
PLATELET # BLD AUTO: 388 10*3/MM3 (ref 140–450)
PMV BLD AUTO: 10.3 FL (ref 6–12)
POTASSIUM SERPL-SCNC: 4.4 MMOL/L (ref 3.5–5.2)
PROT SERPL-MCNC: 8 G/DL (ref 6–8.5)
RBC # BLD AUTO: 4.89 10*6/MM3 (ref 3.77–5.28)
SODIUM SERPL-SCNC: 141 MMOL/L (ref 136–145)
T4 FREE SERPL-MCNC: 1.3 NG/DL (ref 0.93–1.7)
TSH SERPL DL<=0.05 MIU/L-ACNC: 1.64 UIU/ML (ref 0.27–4.2)
WBC # BLD AUTO: 7.74 10*3/MM3 (ref 3.4–10.8)

## 2021-04-19 PROCEDURE — 85652 RBC SED RATE AUTOMATED: CPT

## 2021-04-19 PROCEDURE — 84443 ASSAY THYROID STIM HORMONE: CPT

## 2021-04-19 PROCEDURE — 86140 C-REACTIVE PROTEIN: CPT

## 2021-04-19 PROCEDURE — 83036 HEMOGLOBIN GLYCOSYLATED A1C: CPT

## 2021-04-19 PROCEDURE — 85025 COMPLETE CBC W/AUTO DIFF WBC: CPT

## 2021-04-19 PROCEDURE — 80053 COMPREHEN METABOLIC PANEL: CPT

## 2021-04-19 PROCEDURE — 83615 LACTATE (LD) (LDH) ENZYME: CPT

## 2021-04-19 PROCEDURE — 84439 ASSAY OF FREE THYROXINE: CPT

## 2021-04-19 PROCEDURE — 36415 COLL VENOUS BLD VENIPUNCTURE: CPT

## 2021-06-15 ENCOUNTER — TRANSCRIBE ORDERS (OUTPATIENT)
Dept: ADMINISTRATIVE | Facility: HOSPITAL | Age: 56
End: 2021-06-15

## 2021-06-15 DIAGNOSIS — R59.9 SWELLING OF LYMPH NODES: Primary | ICD-10-CM

## 2021-06-23 ENCOUNTER — OFFICE VISIT (OUTPATIENT)
Dept: PULMONOLOGY | Facility: CLINIC | Age: 56
End: 2021-06-23

## 2021-06-23 ENCOUNTER — LAB (OUTPATIENT)
Dept: LAB | Facility: HOSPITAL | Age: 56
End: 2021-06-23

## 2021-06-23 ENCOUNTER — TRANSCRIBE ORDERS (OUTPATIENT)
Dept: LAB | Facility: HOSPITAL | Age: 56
End: 2021-06-23

## 2021-06-23 VITALS
BODY MASS INDEX: 36.38 KG/M2 | HEIGHT: 69 IN | DIASTOLIC BLOOD PRESSURE: 72 MMHG | OXYGEN SATURATION: 94 % | SYSTOLIC BLOOD PRESSURE: 130 MMHG | HEART RATE: 92 BPM | WEIGHT: 245.6 LBS | RESPIRATION RATE: 20 BRPM

## 2021-06-23 DIAGNOSIS — E66.9 OBESITY (BMI 30-39.9): ICD-10-CM

## 2021-06-23 DIAGNOSIS — R59.9 SWELLING OF LYMPH NODES: ICD-10-CM

## 2021-06-23 DIAGNOSIS — Z00.00 ROUTINE GENERAL MEDICAL EXAMINATION AT A HEALTH CARE FACILITY: ICD-10-CM

## 2021-06-23 DIAGNOSIS — G47.19 EXCESSIVE DAYTIME SLEEPINESS: ICD-10-CM

## 2021-06-23 DIAGNOSIS — Z00.00 ROUTINE GENERAL MEDICAL EXAMINATION AT A HEALTH CARE FACILITY: Primary | ICD-10-CM

## 2021-06-23 DIAGNOSIS — G47.33 OSA (OBSTRUCTIVE SLEEP APNEA): Primary | ICD-10-CM

## 2021-06-23 LAB
ALBUMIN SERPL-MCNC: 4.1 G/DL (ref 3.5–5.2)
ALBUMIN/GLOB SERPL: 1.2 G/DL
ALP SERPL-CCNC: 95 U/L (ref 39–117)
ALT SERPL W P-5'-P-CCNC: 20 U/L (ref 1–33)
ANION GAP SERPL CALCULATED.3IONS-SCNC: 8.7 MMOL/L (ref 5–15)
ANISOCYTOSIS BLD QL: ABNORMAL
AST SERPL-CCNC: 17 U/L (ref 1–32)
BASOPHILS # BLD MANUAL: 0.12 10*3/MM3 (ref 0–0.2)
BASOPHILS NFR BLD AUTO: 2 % (ref 0–1.5)
BILIRUB SERPL-MCNC: 0.2 MG/DL (ref 0–1.2)
BUN SERPL-MCNC: 15 MG/DL (ref 6–20)
BUN/CREAT SERPL: 15 (ref 7–25)
CALCIUM SPEC-SCNC: 9.3 MG/DL (ref 8.6–10.5)
CHLORIDE SERPL-SCNC: 104 MMOL/L (ref 98–107)
CO2 SERPL-SCNC: 27.3 MMOL/L (ref 22–29)
CREAT SERPL-MCNC: 1 MG/DL (ref 0.57–1)
CRP SERPL-MCNC: 1.24 MG/DL (ref 0–0.5)
DEPRECATED RDW RBC AUTO: 47.3 FL (ref 37–54)
EOSINOPHIL # BLD MANUAL: 0.12 10*3/MM3 (ref 0–0.4)
EOSINOPHIL NFR BLD MANUAL: 2 % (ref 0.3–6.2)
ERYTHROCYTE [DISTWIDTH] IN BLOOD BY AUTOMATED COUNT: 15.9 % (ref 12.3–15.4)
ERYTHROCYTE [SEDIMENTATION RATE] IN BLOOD: 39 MM/HR (ref 0–30)
GFR SERPL CREATININE-BSD FRML MDRD: 57 ML/MIN/1.73
GLOBULIN UR ELPH-MCNC: 3.5 GM/DL
GLUCOSE SERPL-MCNC: 85 MG/DL (ref 65–99)
HCT VFR BLD AUTO: 39.4 % (ref 34–46.6)
HGB BLD-MCNC: 12.4 G/DL (ref 12–15.9)
LDH SERPL-CCNC: 142 U/L (ref 135–214)
LYMPHOCYTES # BLD MANUAL: 2.76 10*3/MM3 (ref 0.7–3.1)
LYMPHOCYTES NFR BLD MANUAL: 45 % (ref 19.6–45.3)
LYMPHOCYTES NFR BLD MANUAL: 8 % (ref 5–12)
MCH RBC QN AUTO: 25.9 PG (ref 26.6–33)
MCHC RBC AUTO-ENTMCNC: 31.5 G/DL (ref 31.5–35.7)
MCV RBC AUTO: 82.4 FL (ref 79–97)
MONOCYTES # BLD AUTO: 0.49 10*3/MM3 (ref 0.1–0.9)
NEUTROPHILS # BLD AUTO: 2.64 10*3/MM3 (ref 1.7–7)
NEUTROPHILS NFR BLD MANUAL: 43 % (ref 42.7–76)
PATHOLOGY REVIEW: YES
PLAT MORPH BLD: NORMAL
PLATELET # BLD AUTO: 293 10*3/MM3 (ref 140–450)
PMV BLD AUTO: 11 FL (ref 6–12)
POLYCHROMASIA BLD QL SMEAR: ABNORMAL
POTASSIUM SERPL-SCNC: 4.3 MMOL/L (ref 3.5–5.2)
PROT SERPL-MCNC: 7.6 G/DL (ref 6–8.5)
RBC # BLD AUTO: 4.78 10*6/MM3 (ref 3.77–5.28)
SODIUM SERPL-SCNC: 140 MMOL/L (ref 136–145)
WBC # BLD AUTO: 6.13 10*3/MM3 (ref 3.4–10.8)
WBC MORPH BLD: NORMAL

## 2021-06-23 PROCEDURE — 36415 COLL VENOUS BLD VENIPUNCTURE: CPT

## 2021-06-23 PROCEDURE — 85027 COMPLETE CBC AUTOMATED: CPT

## 2021-06-23 PROCEDURE — 85652 RBC SED RATE AUTOMATED: CPT

## 2021-06-23 PROCEDURE — 86140 C-REACTIVE PROTEIN: CPT

## 2021-06-23 PROCEDURE — 85007 BL SMEAR W/DIFF WBC COUNT: CPT

## 2021-06-23 PROCEDURE — 99212 OFFICE O/P EST SF 10 MIN: CPT | Performed by: NURSE PRACTITIONER

## 2021-06-23 PROCEDURE — 80053 COMPREHEN METABOLIC PANEL: CPT

## 2021-06-23 PROCEDURE — 83615 LACTATE (LD) (LDH) ENZYME: CPT

## 2021-06-23 RX ORDER — METFORMIN HYDROCHLORIDE 750 MG/1
TABLET, EXTENDED RELEASE ORAL
COMMUNITY
Start: 2020-09-01 | End: 2021-06-23 | Stop reason: SDUPTHER

## 2021-06-23 RX ORDER — METFORMIN HYDROCHLORIDE 750 MG/1
750 TABLET, EXTENDED RELEASE ORAL
COMMUNITY
Start: 2021-04-28

## 2021-06-23 RX ORDER — ERGOCALCIFEROL 1.25 MG/1
50000 CAPSULE ORAL
COMMUNITY
Start: 2021-05-14

## 2021-06-23 RX ORDER — CYCLOBENZAPRINE HCL 10 MG
TABLET ORAL
COMMUNITY
Start: 2021-04-16

## 2021-06-23 NOTE — PROGRESS NOTES
"Chief Complaint   Patient presents with   • Follow-up   • Sleep Apnea         Subjective   Lasonia PARVIN Arvizu is a 55 y.o. female.     History of Present Illness   Patient comes back today for follow up of Obstructive Sleep apnea. she doesn't report any issues with the device or mask.     Patient says that she is compliant with her device and using it regularly.    Patient's symptoms of sleep disturbance and daytime sleepiness have been helped greatly with the use of PAP device, as prescribed. She feels rested most days upon awakening.     She uses nasal pillows. No issue with humidity.       The following portions of the patient's history were reviewed and updated as appropriate: allergies, current medications, past family history, past medical history, past social history and past surgical history.    Review of Systems   HENT: Negative for congestion, rhinorrhea, sinus pressure, sinus pain and sneezing.    Respiratory: Negative for cough, chest tightness, shortness of breath and wheezing.    Cardiovascular: Negative for chest pain, palpitations and leg swelling.   Psychiatric/Behavioral: Negative for sleep disturbance.       Objective   Visit Vitals  /72   Pulse 92   Resp 20   Ht 175.3 cm (69.02\")   Wt 111 kg (245 lb 9.6 oz)   SpO2 94%   BMI 36.25 kg/m²       Physical Exam  Vitals reviewed.   Constitutional:       Appearance: She is well-developed.   HENT:      Head: Atraumatic.      Mouth/Throat:      Mouth: Mucous membranes are moist.      Comments: Mallempati IV/IV. Fillings noted.  Eyes:      Extraocular Movements: Extraocular movements intact.   Abdominal:      Comments: Obese abdomen.   Musculoskeletal:      Comments: Gait normal.   Skin:     General: Skin is warm.   Neurological:      Mental Status: She is alert and oriented to person, place, and time.             Assessment/Plan   Diagnoses and all orders for this visit:    1. GINA (obstructive sleep apnea) (Primary)    2. Obesity (BMI 30-39.9)    3. " Excessive daytime sleepiness           Return in about 11 months (around 5/23/2022) for Recheck, For Dr. Calderon, Sleep ONLY.    DISCUSSION (if any):  Continue treatment with AutoPAP at a pressure of 8/15, with a nasal pillows.    Patient's compliance data was reviewed and the compliance is 100%.    Humidification setup, hose and mask care discussed.    Weight loss advised.    Use every night for at least 4 hours stressed.    Discussed recall of Melina [employer]ir[employer] cpap machine. She has severe sleep apnea so I have discussed with her that the benefits of continuing to use the machine outweighs the risks. She verbalizes understanding.     I have asked her to look up the recall info to familiarize herself with it and call if she has questions.     Dictated utilizing Dragon dictation.    This document was electronically signed by BRAIN Lacy June 23, 2021  13:38 EDT

## 2021-06-24 LAB
LAB AP CASE REPORT: NORMAL
LAB AP CLINICAL INFORMATION: NORMAL
PATH REPORT.FINAL DX SPEC: NORMAL
PATH REPORT.GROSS SPEC: NORMAL

## 2021-08-16 ENCOUNTER — TRANSCRIBE ORDERS (OUTPATIENT)
Dept: ADMINISTRATIVE | Facility: HOSPITAL | Age: 56
End: 2021-08-16

## 2021-08-16 DIAGNOSIS — Z12.31 VISIT FOR SCREENING MAMMOGRAM: Primary | ICD-10-CM

## 2021-08-30 ENCOUNTER — HOSPITAL ENCOUNTER (OUTPATIENT)
Dept: MAMMOGRAPHY | Facility: HOSPITAL | Age: 56
Discharge: HOME | End: 2021-08-30
Admitting: OBSTETRICS & GYNECOLOGY

## 2021-08-30 DIAGNOSIS — Z12.31 VISIT FOR SCREENING MAMMOGRAM: ICD-10-CM

## 2021-08-30 PROCEDURE — 77063 BREAST TOMOSYNTHESIS BI: CPT

## 2021-08-30 PROCEDURE — 77067 SCR MAMMO BI INCL CAD: CPT

## 2021-08-31 ENCOUNTER — HOSPITAL ENCOUNTER (OUTPATIENT)
Dept: ULTRASOUND IMAGING | Facility: HOSPITAL | Age: 56
Discharge: HOME | End: 2021-08-31
Admitting: FAMILY MEDICINE

## 2021-08-31 DIAGNOSIS — R59.9 SWELLING OF LYMPH NODES: ICD-10-CM

## 2021-08-31 PROCEDURE — 76536 US EXAM OF HEAD AND NECK: CPT

## 2021-09-03 ENCOUNTER — TRANSCRIBE ORDERS (OUTPATIENT)
Dept: ADMINISTRATIVE | Facility: HOSPITAL | Age: 56
End: 2021-09-03

## 2021-09-03 DIAGNOSIS — R59.9 ENLARGED LYMPH NODES: Primary | ICD-10-CM

## 2021-09-08 ENCOUNTER — APPOINTMENT (OUTPATIENT)
Dept: CT IMAGING | Facility: HOSPITAL | Age: 56
End: 2021-09-08

## 2021-09-10 ENCOUNTER — HOSPITAL ENCOUNTER (OUTPATIENT)
Dept: CT IMAGING | Facility: HOSPITAL | Age: 56
Discharge: HOME | End: 2021-09-10

## 2021-09-10 DIAGNOSIS — R59.9 ENLARGED LYMPH NODES: ICD-10-CM

## 2021-09-10 PROCEDURE — 70491 CT SOFT TISSUE NECK W/DYE: CPT

## 2021-09-10 PROCEDURE — 71260 CT THORAX DX C+: CPT

## 2021-09-10 PROCEDURE — 25010000002 IOPAMIDOL 61 % SOLUTION: Performed by: FAMILY MEDICINE

## 2021-09-10 RX ADMIN — IOPAMIDOL 100 ML: 612 INJECTION, SOLUTION INTRAVENOUS at 07:37

## 2021-09-16 ENCOUNTER — TRANSCRIBE ORDERS (OUTPATIENT)
Dept: ADMINISTRATIVE | Facility: HOSPITAL | Age: 56
End: 2021-09-16

## 2021-09-16 DIAGNOSIS — R59.9 ENLARGED LYMPH NODE: Primary | ICD-10-CM

## 2021-09-21 ENCOUNTER — HOSPITAL ENCOUNTER (OUTPATIENT)
Dept: ULTRASOUND IMAGING | Facility: HOSPITAL | Age: 56
Discharge: HOME | End: 2021-09-21
Admitting: FAMILY MEDICINE

## 2021-09-21 DIAGNOSIS — R59.9 ENLARGED LYMPH NODE: ICD-10-CM

## 2021-09-21 PROCEDURE — 76942 ECHO GUIDE FOR BIOPSY: CPT

## 2021-10-20 NOTE — PROGRESS NOTES
Patient: Danny Arvizu    YOB: 1965    Date: 10/25/2021    Primary Care Provider: Navya Burns MD    Chief Complaint   Patient presents with   • Swollen Glands     right cervical area.   • Abnormal Imaging     CT soft tissue of the neck showed enlarged lymph nodes in the neck and chest.       SUBJECTIVE:    History of present illness:  The patient is in the office today for evaluation and treatment of enlarged lymph nodes in the neck.  She had an ultrasound guided FNA of the area at the hospital but the specimen was non-diagnostic.  Patient had CT scan of the soft tissues of her neck which showed enlarged lymph nodes in the base of the neck and chest.  Patient stated that she does feel the enlarged lymph node in her right neck which has been present for @ 5 months.  Patient added that the area was sore to touch when she first noticed it but it is not painful now.  Patient concerned about mediastinal lymphadenopathy, recent FNA of her cervical lymph node was nondiagnostic.    The following portions of the patient's history were reviewed and updated as appropriate: allergies, current medications, past family history, past medical history, past social history, past surgical history and problem list.      Review of Systems   Constitutional: Negative for chills, fever and unexpected weight change.   HENT: Negative for hearing loss, trouble swallowing and voice change.    Eyes: Negative for visual disturbance.   Respiratory: Negative for apnea, cough, chest tightness, shortness of breath and wheezing.    Cardiovascular: Negative for chest pain, palpitations and leg swelling.   Gastrointestinal: Negative for abdominal distention, abdominal pain, anal bleeding, blood in stool, constipation, diarrhea, nausea, rectal pain and vomiting.   Endocrine: Negative for cold intolerance and heat intolerance.   Genitourinary: Negative for difficulty urinating, dysuria and flank pain.   Musculoskeletal: Negative for  back pain and gait problem.   Skin: Negative for color change, rash and wound.   Neurological: Negative for dizziness, syncope, speech difficulty, weakness, light-headedness, numbness and headaches.   Hematological: Negative for adenopathy. Does not bruise/bleed easily.   Psychiatric/Behavioral: Negative for confusion. The patient is not nervous/anxious.        Allergies:  Allergies   Allergen Reactions   • Demerol [Meperidine] Nausea And Vomiting   • Tetracyclines & Related Nausea And Vomiting       Medications:    Current Outpatient Medications:   •  cyclobenzaprine (FLEXERIL) 10 MG tablet, TAKE 1 TABLET BY MOUTH NIGHTLY AS NEEDED FOR MUSCLE SPASM, Disp: , Rfl:   •  fluticasone (FLONASE) 50 MCG/ACT nasal spray, fluticasone propionate 50 mcg/actuation nasal spray,suspension  1 spray in each nostril BID, aiming the tip of the applicator towards the top of the ear on the same side.  Sniff gently., Disp: , Rfl:   •  levothyroxine (SYNTHROID, LEVOTHROID) 75 MCG tablet, Take 75 mcg by mouth Daily., Disp: , Rfl: 2  •  metFORMIN ER (GLUCOPHAGE-XR) 750 MG 24 hr tablet, , Disp: , Rfl:   •  omeprazole (priLOSEC) 20 MG capsule, TAKE ONE CAPSULE BY MOUTH ONCE DAILY, Disp: 90 capsule, Rfl: 3  •  sertraline (ZOLOFT) 50 MG tablet, sertraline 50 mg tablet, Disp: , Rfl:   •  simvastatin (ZOCOR) 40 MG tablet, Take 1 tablet by mouth Daily., Disp: 90 tablet, Rfl: 3  •  vitamin D (ERGOCALCIFEROL) 1.25 MG (64419 UT) capsule capsule, , Disp: , Rfl:   •  levothyroxine (SYNTHROID, LEVOTHROID) 25 MCG tablet, levothyroxine 25 mcg tablet, Disp: , Rfl:   •  simvastatin (ZOCOR) 40 MG tablet, simvastatin 40 mg tablet, Disp: , Rfl:     History:  Past Medical History:   Diagnosis Date   • Anxiety and depression    • Body piercing     EARS   • C. difficile diarrhea 02/21/2018    REPORTS WAS TREATED   • Disease of thyroid gland    • High cholesterol    • History of pneumonia    • PONV (postoperative nausea and vomiting)    • Seasonal allergies    •  "Sinus problem    • Wears glasses        Past Surgical History:   Procedure Laterality Date   • COLONOSCOPY N/A 4/13/2018    Procedure: COLONOSCOPY;  Surgeon: Radha Jeter MD;  Location: [facility];  Service: Gastroenterology   • DILATATION AND CURETTAGE     • FINGER SURGERY Right     INDEX FINGER   • FOOT SURGERY Left    • HYSTEROSCOPY ENDOMETRIAL ABLATION     • OTHER SURGICAL HISTORY Left     REPORTS HAD A BIRTH JULIANNE REMOVED FROM LEFT ARM AS A TEENAGER   • US GUIDED LYMPH NODE BIOPSY  9/21/2021       Family History   Problem Relation Age of Onset   • Heart attack Father    • Hypertension Father    • Heart attack Other         Grandmother & Uncles   • Cancer Other         Grandmother   • Hypertension Other         Grandparents   • Breast cancer Cousin 35   • Ovarian cancer Neg Hx        Social History     Tobacco Use   • Smoking status: Never Smoker   • Smokeless tobacco: Never Used   Vaping Use   • Vaping Use: Never used   Substance Use Topics   • Alcohol use: Yes     Comment: REPORTS SOCIAL USE ONLY, NO HX OF ABUSE   • Drug use: No        OBJECTIVE:    Vital Signs:   Vitals:    10/25/21 1312   BP: 120/82   Pulse: 88   Resp: 18   Temp: 98.2 °F (36.8 °C)   TempSrc: Temporal   SpO2: 98%   Weight: 113 kg (250 lb)   Height: 175.3 cm (69\")       Physical Exam:   General Appearance:    Alert, cooperative, in no acute distress   Head:    Normocephalic, without obvious abnormality, atraumatic   Eyes:            Lids and lashes normal, conjunctivae and sclerae normal, no   icterus, no pallor, corneas clear, PERRLA   Ears:    Ears appear intact with no abnormalities noted   Throat:   No oral lesions, no thrush, oral mucosa moist   Neck:   No adenopathy, supple, trachea midline, no thyromegaly, no   carotid bruit, no JVD   Lungs:     Clear to auscultation,respirations regular, even and                  unlabored    Heart:    Regular rhythm and normal rate, normal S1 and S2, no            murmur, no gallop, no rub, no " click   Chest Wall:    No abnormalities observed   Abdomen:     Normal bowel sounds, no masses, no organomegaly, soft        non-tender, non-distended, no guarding, no rebound                tenderness   Extremities:   Moves all extremities well, no edema, no cyanosis, no             redness   Pulses:   Pulses palpable and equal bilaterally   Skin:   No bleeding, bruising or rash   Lymph nodes:   No palpable adenopathy   Neurologic:   Cranial nerves 2 - 12 grossly intact, sensation intact, DTR       present and equal bilaterally     Results Review:   I reviewed the patient's new clinical results.    Review of Systems was reviewed and confirmed as accurate as documented by the MA.    ASSESSMENT/PLAN:    1. Enlarged lymph nodes    2. Lymphadenopathy of head and neck region        Patient scheduled for excision of supraclavicular lymph node on the right.  Risk of bleeding infection and lymphocele formation discussed and patient agreeable.  Also concerned about sensory nerve damage as well and she is agreeable.    I discussed the patients findings and my recommendations with patient        Electronically signed by Radha Jeter MD  10/25/21

## 2021-10-25 ENCOUNTER — OFFICE VISIT (OUTPATIENT)
Dept: SURGERY | Facility: CLINIC | Age: 56
End: 2021-10-25

## 2021-10-25 VITALS
WEIGHT: 250 LBS | RESPIRATION RATE: 18 BRPM | OXYGEN SATURATION: 98 % | SYSTOLIC BLOOD PRESSURE: 120 MMHG | HEIGHT: 69 IN | DIASTOLIC BLOOD PRESSURE: 82 MMHG | BODY MASS INDEX: 37.03 KG/M2 | TEMPERATURE: 98.2 F | HEART RATE: 88 BPM

## 2021-10-25 DIAGNOSIS — Z01.818 PREOP TESTING: Primary | ICD-10-CM

## 2021-10-25 DIAGNOSIS — R59.0 LYMPHADENOPATHY OF HEAD AND NECK REGION: ICD-10-CM

## 2021-10-25 DIAGNOSIS — R59.9 ENLARGED LYMPH NODES: Primary | ICD-10-CM

## 2021-10-25 PROCEDURE — 99203 OFFICE O/P NEW LOW 30 MIN: CPT | Performed by: SURGERY

## 2021-10-25 RX ORDER — LEVOTHYROXINE SODIUM 0.03 MG/1
TABLET ORAL
COMMUNITY
End: 2021-10-26

## 2021-10-25 RX ORDER — SIMVASTATIN 40 MG
TABLET ORAL
COMMUNITY
End: 2021-10-26 | Stop reason: SDUPTHER

## 2021-10-25 RX ORDER — FLUTICASONE PROPIONATE 50 MCG
SPRAY, SUSPENSION (ML) NASAL
COMMUNITY

## 2021-10-26 ENCOUNTER — PRE-ADMISSION TESTING (OUTPATIENT)
Dept: PREADMISSION TESTING | Facility: HOSPITAL | Age: 56
End: 2021-10-26

## 2021-10-26 VITALS — WEIGHT: 251.4 LBS | HEIGHT: 69 IN | BODY MASS INDEX: 37.23 KG/M2

## 2021-10-26 DIAGNOSIS — Z01.818 PREOP TESTING: ICD-10-CM

## 2021-10-26 LAB
ANION GAP SERPL CALCULATED.3IONS-SCNC: 9.4 MMOL/L (ref 5–15)
BUN SERPL-MCNC: 17 MG/DL (ref 6–20)
BUN/CREAT SERPL: 19.1 (ref 7–25)
CALCIUM SPEC-SCNC: 9.6 MG/DL (ref 8.6–10.5)
CHLORIDE SERPL-SCNC: 104 MMOL/L (ref 98–107)
CO2 SERPL-SCNC: 23.6 MMOL/L (ref 22–29)
CREAT SERPL-MCNC: 0.89 MG/DL (ref 0.57–1)
DEPRECATED RDW RBC AUTO: 45.9 FL (ref 37–54)
ERYTHROCYTE [DISTWIDTH] IN BLOOD BY AUTOMATED COUNT: 14.7 % (ref 12.3–15.4)
GFR SERPL CREATININE-BSD FRML MDRD: 65 ML/MIN/1.73
GLUCOSE SERPL-MCNC: 100 MG/DL (ref 65–99)
HCT VFR BLD AUTO: 43 % (ref 34–46.6)
HGB BLD-MCNC: 13.9 G/DL (ref 12–15.9)
MCH RBC QN AUTO: 27.7 PG (ref 26.6–33)
MCHC RBC AUTO-ENTMCNC: 32.3 G/DL (ref 31.5–35.7)
MCV RBC AUTO: 85.8 FL (ref 79–97)
PLATELET # BLD AUTO: 258 10*3/MM3 (ref 140–450)
PMV BLD AUTO: 10.6 FL (ref 6–12)
POTASSIUM SERPL-SCNC: 4.2 MMOL/L (ref 3.5–5.2)
RBC # BLD AUTO: 5.01 10*6/MM3 (ref 3.77–5.28)
SODIUM SERPL-SCNC: 137 MMOL/L (ref 136–145)
WBC # BLD AUTO: 6.39 10*3/MM3 (ref 3.4–10.8)

## 2021-10-26 PROCEDURE — 80048 BASIC METABOLIC PNL TOTAL CA: CPT

## 2021-10-26 PROCEDURE — 85027 COMPLETE CBC AUTOMATED: CPT

## 2021-10-26 PROCEDURE — U0005 INFEC AGEN DETEC AMPLI PROBE: HCPCS

## 2021-10-26 PROCEDURE — 93005 ELECTROCARDIOGRAM TRACING: CPT

## 2021-10-26 PROCEDURE — C9803 HOPD COVID-19 SPEC COLLECT: HCPCS

## 2021-10-26 PROCEDURE — 93010 ELECTROCARDIOGRAM REPORT: CPT | Performed by: INTERNAL MEDICINE

## 2021-10-26 PROCEDURE — 36415 COLL VENOUS BLD VENIPUNCTURE: CPT

## 2021-10-26 PROCEDURE — U0004 COV-19 TEST NON-CDC HGH THRU: HCPCS

## 2021-10-27 LAB
QT INTERVAL: 354 MS
QTC INTERVAL: 440 MS
SARS-COV-2 RNA NOSE QL NAA+PROBE: NOT DETECTED

## 2021-10-28 ENCOUNTER — ANESTHESIA EVENT (OUTPATIENT)
Dept: PERIOP | Facility: HOSPITAL | Age: 56
End: 2021-10-28

## 2021-10-28 ENCOUNTER — ANESTHESIA (OUTPATIENT)
Dept: PERIOP | Facility: HOSPITAL | Age: 56
End: 2021-10-28

## 2021-10-28 ENCOUNTER — HOSPITAL ENCOUNTER (OUTPATIENT)
Facility: HOSPITAL | Age: 56
Setting detail: HOSPITAL OUTPATIENT SURGERY
Discharge: HOME | End: 2021-10-28
Attending: SURGERY | Admitting: SURGERY

## 2021-10-28 VITALS
OXYGEN SATURATION: 98 % | RESPIRATION RATE: 16 BRPM | DIASTOLIC BLOOD PRESSURE: 78 MMHG | HEART RATE: 82 BPM | TEMPERATURE: 97.8 F | SYSTOLIC BLOOD PRESSURE: 140 MMHG

## 2021-10-28 DIAGNOSIS — R59.0 LYMPHADENOPATHY OF HEAD AND NECK REGION: ICD-10-CM

## 2021-10-28 DIAGNOSIS — R59.9 ENLARGED LYMPH NODES: ICD-10-CM

## 2021-10-28 LAB
GLUCOSE BLDC GLUCOMTR-MCNC: 76 MG/DL (ref 70–130)
GLUCOSE BLDC GLUCOMTR-MCNC: 83 MG/DL (ref 70–130)

## 2021-10-28 PROCEDURE — 25010000002 FENTANYL CITRATE (PF) 100 MCG/2ML SOLUTION: Performed by: NURSE ANESTHETIST, CERTIFIED REGISTERED

## 2021-10-28 PROCEDURE — 87075 CULTR BACTERIA EXCEPT BLOOD: CPT | Performed by: SURGERY

## 2021-10-28 PROCEDURE — 82962 GLUCOSE BLOOD TEST: CPT

## 2021-10-28 PROCEDURE — 0 CEFAZOLIN SODIUM-DEXTROSE 2-3 GM-%(50ML) RECONSTITUTED SOLUTION: Performed by: NURSE ANESTHETIST, CERTIFIED REGISTERED

## 2021-10-28 PROCEDURE — 25010000002 KETOROLAC TROMETHAMINE PER 15 MG: Performed by: NURSE ANESTHETIST, CERTIFIED REGISTERED

## 2021-10-28 PROCEDURE — 38510 BIOPSY/REMOVAL LYMPH NODES: CPT | Performed by: SURGERY

## 2021-10-28 PROCEDURE — 25010000002 DEXAMETHASONE PER 1 MG: Performed by: NURSE ANESTHETIST, CERTIFIED REGISTERED

## 2021-10-28 PROCEDURE — 87116 MYCOBACTERIA CULTURE: CPT | Performed by: SURGERY

## 2021-10-28 PROCEDURE — 94799 UNLISTED PULMONARY SVC/PX: CPT

## 2021-10-28 PROCEDURE — 87206 SMEAR FLUORESCENT/ACID STAI: CPT | Performed by: SURGERY

## 2021-10-28 PROCEDURE — 25010000002 ONDANSETRON PER 1 MG: Performed by: NURSE ANESTHETIST, CERTIFIED REGISTERED

## 2021-10-28 PROCEDURE — 25010000002 PROPOFOL 200 MG/20ML EMULSION: Performed by: NURSE ANESTHETIST, CERTIFIED REGISTERED

## 2021-10-28 PROCEDURE — 87076 CULTURE ANAEROBE IDENT EACH: CPT | Performed by: SURGERY

## 2021-10-28 RX ORDER — LIDOCAINE HYDROCHLORIDE 20 MG/ML
INJECTION, SOLUTION INTRAVENOUS AS NEEDED
Status: DISCONTINUED | OUTPATIENT
Start: 2021-10-28 | End: 2021-10-28 | Stop reason: SURG

## 2021-10-28 RX ORDER — ONDANSETRON 2 MG/ML
4 INJECTION INTRAMUSCULAR; INTRAVENOUS ONCE AS NEEDED
Status: DISCONTINUED | OUTPATIENT
Start: 2021-10-28 | End: 2021-10-28 | Stop reason: HOSPADM

## 2021-10-28 RX ORDER — MEPERIDINE HYDROCHLORIDE 25 MG/ML
12.5 INJECTION INTRAMUSCULAR; INTRAVENOUS; SUBCUTANEOUS
Status: DISCONTINUED | OUTPATIENT
Start: 2021-10-28 | End: 2021-10-28 | Stop reason: HOSPADM

## 2021-10-28 RX ORDER — CEFAZOLIN SODIUM 2 G/50ML
SOLUTION INTRAVENOUS AS NEEDED
Status: DISCONTINUED | OUTPATIENT
Start: 2021-10-28 | End: 2021-10-28 | Stop reason: SURG

## 2021-10-28 RX ORDER — SODIUM CHLORIDE 9 MG/ML
INJECTION, SOLUTION INTRAVENOUS CONTINUOUS PRN
Status: DISCONTINUED | OUTPATIENT
Start: 2021-10-28 | End: 2021-10-28 | Stop reason: SURG

## 2021-10-28 RX ORDER — PROPOFOL 10 MG/ML
INJECTION, EMULSION INTRAVENOUS AS NEEDED
Status: DISCONTINUED | OUTPATIENT
Start: 2021-10-28 | End: 2021-10-28 | Stop reason: SURG

## 2021-10-28 RX ORDER — BUPIVACAINE HYDROCHLORIDE 5 MG/ML
INJECTION, SOLUTION EPIDURAL; INTRACAUDAL AS NEEDED
Status: DISCONTINUED | OUTPATIENT
Start: 2021-10-28 | End: 2021-10-28 | Stop reason: HOSPADM

## 2021-10-28 RX ORDER — FENTANYL CITRATE 50 UG/ML
INJECTION, SOLUTION INTRAMUSCULAR; INTRAVENOUS AS NEEDED
Status: DISCONTINUED | OUTPATIENT
Start: 2021-10-28 | End: 2021-10-28 | Stop reason: SURG

## 2021-10-28 RX ORDER — KETOROLAC TROMETHAMINE 30 MG/ML
INJECTION, SOLUTION INTRAMUSCULAR; INTRAVENOUS AS NEEDED
Status: DISCONTINUED | OUTPATIENT
Start: 2021-10-28 | End: 2021-10-28 | Stop reason: SURG

## 2021-10-28 RX ORDER — ONDANSETRON 2 MG/ML
INJECTION INTRAMUSCULAR; INTRAVENOUS AS NEEDED
Status: DISCONTINUED | OUTPATIENT
Start: 2021-10-28 | End: 2021-10-28 | Stop reason: SURG

## 2021-10-28 RX ORDER — DEXAMETHASONE SODIUM PHOSPHATE 4 MG/ML
INJECTION, SOLUTION INTRA-ARTICULAR; INTRALESIONAL; INTRAMUSCULAR; INTRAVENOUS; SOFT TISSUE AS NEEDED
Status: DISCONTINUED | OUTPATIENT
Start: 2021-10-28 | End: 2021-10-28 | Stop reason: SURG

## 2021-10-28 RX ORDER — SODIUM CHLORIDE, SODIUM LACTATE, POTASSIUM CHLORIDE, CALCIUM CHLORIDE 600; 310; 30; 20 MG/100ML; MG/100ML; MG/100ML; MG/100ML
1000 INJECTION, SOLUTION INTRAVENOUS CONTINUOUS
Status: DISCONTINUED | OUTPATIENT
Start: 2021-10-28 | End: 2021-10-28 | Stop reason: HOSPADM

## 2021-10-28 RX ORDER — HYDROCODONE BITARTRATE AND ACETAMINOPHEN 5; 325 MG/1; MG/1
1-2 TABLET ORAL EVERY 4 HOURS PRN
Qty: 12 TABLET | Refills: 0 | Status: SHIPPED | OUTPATIENT
Start: 2021-10-28 | End: 2022-01-28

## 2021-10-28 RX ORDER — LORAZEPAM 2 MG/ML
1 INJECTION INTRAMUSCULAR
Status: DISCONTINUED | OUTPATIENT
Start: 2021-10-28 | End: 2021-10-28 | Stop reason: HOSPADM

## 2021-10-28 RX ADMIN — FENTANYL CITRATE 100 MCG: 50 INJECTION INTRAMUSCULAR; INTRAVENOUS at 14:27

## 2021-10-28 RX ADMIN — CEFAZOLIN SODIUM 2 G: 2 SOLUTION INTRAVENOUS at 14:27

## 2021-10-28 RX ADMIN — DEXAMETHASONE SODIUM PHOSPHATE 4 MG: 4 INJECTION, SOLUTION INTRAMUSCULAR; INTRAVENOUS at 14:27

## 2021-10-28 RX ADMIN — LIDOCAINE HYDROCHLORIDE 100 MG: 20 INJECTION, SOLUTION INTRAVENOUS at 14:27

## 2021-10-28 RX ADMIN — ONDANSETRON 4 MG: 2 INJECTION INTRAMUSCULAR; INTRAVENOUS at 14:27

## 2021-10-28 RX ADMIN — GLYCOPYRROLATE 0.2 MCG: 0.2 INJECTION, SOLUTION INTRAMUSCULAR; INTRAVITREAL at 14:47

## 2021-10-28 RX ADMIN — KETOROLAC TROMETHAMINE 30 MG: 30 INJECTION, SOLUTION INTRAMUSCULAR at 14:54

## 2021-10-28 RX ADMIN — PROPOFOL 200 MG: 10 INJECTION, EMULSION INTRAVENOUS at 14:27

## 2021-10-28 RX ADMIN — SODIUM CHLORIDE: 9 INJECTION, SOLUTION INTRAVENOUS at 14:27

## 2021-10-28 NOTE — ANESTHESIA POSTPROCEDURE EVALUATION
Patient: Danny Arvizu    Procedure Summary       Date: 10/28/21 Room / Location: [facility] OR  /  [facility]    Anesthesia Start: 1417 Anesthesia Stop: 1511    Procedure: EXCISION RIGHT NECK LYMPH NODE (Right Neck) Diagnosis:       Enlarged lymph nodes      Lymphadenopathy of head and neck region      (Enlarged lymph nodes [R59.9])      (Lymphadenopathy of head and neck region [R59.0])    Surgeons: Radha Jeter MD Provider: Jace Avalos CRNA    Anesthesia Type: MAC ASA Status: 3            Anesthesia Type: MAC    Vitals  Vitals Value Taken Time   /74 10/28/21 1520   Temp 97.2 °F (36.2 °C) 10/28/21 1510   Pulse 104 10/28/21 1520   Resp 14 10/28/21 1520   SpO2 100 % 10/28/21 1520           Post Anesthesia Care and Evaluation    Patient location during evaluation: PACU  Patient participation: complete - patient participated  Level of consciousness: awake and alert and awake  Pain score: 3  Pain management: adequate  Airway patency: patent  Anesthetic complications: No anesthetic complications  PONV Status: controlled  Cardiovascular status: acceptable, hemodynamically stable and stable  Respiratory status: acceptable and nasal cannula  Hydration status: acceptable

## 2021-10-28 NOTE — ANESTHESIA PREPROCEDURE EVALUATION
Anesthesia Evaluation     Patient summary reviewed and Nursing notes reviewed   history of anesthetic complications: PONV  NPO Solid Status: > 8 hours  NPO Liquid Status: > 8 hours           Airway   Mallampati: II  TM distance: >3 FB  Possible difficult intubation  Dental      Pulmonary    (+) shortness of breath, sleep apnea, decreased breath sounds,   Cardiovascular     (+) OSORIO, hyperlipidemia,   CAD:  inc risk obese, tani, dm.      Neuro/Psych  (+) psychiatric history,     GI/Hepatic/Renal/Endo    (+) obesity,  GERD,  diabetes mellitus type 2 poorly controlled,     Musculoskeletal     Abdominal   (+) obese,    Substance History      OB/GYN          Other                      Anesthesia Plan    ASA 3     general   (Risks and benefits discussed including risk of aspiration, recall and dental damage. All patient questions answered.    Will continue with plan of care.)  intravenous induction     Anesthetic plan, all risks, benefits, and alternatives have been provided, discussed and informed consent has been obtained with: patient.

## 2021-10-28 NOTE — ANESTHESIA PROCEDURE NOTES
Airway  Urgency: elective    Date/Time: 10/28/2021 2:31 PM  Airway not difficult    General Information and Staff    Patient location during procedure: OR  CRNA: Jace Avalos CRNA    Indications and Patient Condition  Indications for airway management: airway protection    Preoxygenated: yes  Mask difficulty assessment: 1 - vent by mask    Final Airway Details  Final airway type: supraglottic airway      Successful airway: unique  Size 4    Number of attempts at approach: 1  Assessment: lips, teeth, and gum same as pre-op and atraumatic intubation    Additional Comments  LMA placed easily without trauma. Dentition and lips as noted pre-induction. Factory cuff pressure to minimal occlusive pressure.

## 2021-11-03 ENCOUNTER — OFFICE VISIT (OUTPATIENT)
Dept: SURGERY | Facility: CLINIC | Age: 56
End: 2021-11-03

## 2021-11-03 VITALS
DIASTOLIC BLOOD PRESSURE: 64 MMHG | RESPIRATION RATE: 18 BRPM | SYSTOLIC BLOOD PRESSURE: 118 MMHG | TEMPERATURE: 97.7 F | HEIGHT: 69 IN | HEART RATE: 94 BPM | BODY MASS INDEX: 37.22 KG/M2 | OXYGEN SATURATION: 94 % | WEIGHT: 251.32 LBS

## 2021-11-03 DIAGNOSIS — Z48.89 POSTOPERATIVE VISIT: Primary | ICD-10-CM

## 2021-11-03 LAB
LAB AP CASE REPORT: NORMAL
PATH REPORT.FINAL DX SPEC: NORMAL

## 2021-11-03 PROCEDURE — 99024 POSTOP FOLLOW-UP VISIT: CPT | Performed by: SURGERY

## 2021-11-04 LAB — BACTERIA SPEC ANAEROBE CULT: ABNORMAL

## 2021-11-11 ENCOUNTER — LAB (OUTPATIENT)
Dept: LAB | Facility: HOSPITAL | Age: 56
End: 2021-11-11

## 2021-11-11 ENCOUNTER — TRANSCRIBE ORDERS (OUTPATIENT)
Dept: LAB | Facility: HOSPITAL | Age: 56
End: 2021-11-11

## 2021-11-11 DIAGNOSIS — R59.0 LYMPHADENOPATHY, CERVICAL: Primary | ICD-10-CM

## 2021-11-11 DIAGNOSIS — I88.8 DERMATOPATHIC LYMPHADENITIS: ICD-10-CM

## 2021-11-11 DIAGNOSIS — L99 DERMATOPATHIC LYMPHADENITIS: ICD-10-CM

## 2021-11-11 DIAGNOSIS — R59.0 LYMPHADENOPATHY, CERVICAL: ICD-10-CM

## 2021-11-11 LAB
CRP SERPL-MCNC: 0.43 MG/DL (ref 0–0.5)
ERYTHROCYTE [SEDIMENTATION RATE] IN BLOOD: 40 MM/HR (ref 0–30)

## 2021-11-11 PROCEDURE — 85652 RBC SED RATE AUTOMATED: CPT

## 2021-11-11 PROCEDURE — 36415 COLL VENOUS BLD VENIPUNCTURE: CPT

## 2021-11-11 PROCEDURE — 86140 C-REACTIVE PROTEIN: CPT

## 2021-11-11 PROCEDURE — 86480 TB TEST CELL IMMUN MEASURE: CPT

## 2021-11-17 LAB
GAMMA INTERFERON BACKGROUND BLD IA-ACNC: 0.03 IU/ML
M TB IFN-G BLD-IMP: NEGATIVE
M TB IFN-G CD4+ BCKGRND COR BLD-ACNC: 0.03 IU/ML
M TB IFN-G CD4+CD8+ BCKGRND COR BLD-ACNC: 0.26 IU/ML
MITOGEN IGNF BLD-ACNC: >10 IU/ML
SERVICE CMNT-IMP: NORMAL

## 2021-12-11 LAB
ACID FAST STN SPEC: NEGATIVE
MYCOBACTERIUM SPEC QL CULT: NEGATIVE
SPECIMEN PREPARATION: NORMAL

## 2022-01-10 LAB
LAB AP CASE REPORT: NORMAL
PATH REPORT.ADDENDUM SPEC: NORMAL
PATH REPORT.FINAL DX SPEC: NORMAL

## 2022-01-18 DIAGNOSIS — G47.33 OSA (OBSTRUCTIVE SLEEP APNEA): Primary | ICD-10-CM

## 2022-01-28 ENCOUNTER — OFFICE VISIT (OUTPATIENT)
Dept: SURGERY | Facility: CLINIC | Age: 57
End: 2022-01-28

## 2022-01-28 VITALS
OXYGEN SATURATION: 96 % | TEMPERATURE: 98.7 F | SYSTOLIC BLOOD PRESSURE: 130 MMHG | RESPIRATION RATE: 18 BRPM | HEIGHT: 69 IN | DIASTOLIC BLOOD PRESSURE: 64 MMHG | BODY MASS INDEX: 37.18 KG/M2 | WEIGHT: 251 LBS | HEART RATE: 88 BPM

## 2022-01-28 DIAGNOSIS — L03.221 CELLULITIS OF NECK: Primary | ICD-10-CM

## 2022-01-28 PROCEDURE — 99213 OFFICE O/P EST LOW 20 MIN: CPT | Performed by: SURGERY

## 2022-01-28 RX ORDER — AMOXICILLIN AND CLAVULANATE POTASSIUM 875; 125 MG/1; MG/1
1 TABLET, FILM COATED ORAL 2 TIMES DAILY
Qty: 14 TABLET | Refills: 0 | Status: SHIPPED | OUTPATIENT
Start: 2022-01-28

## 2022-01-28 NOTE — PROGRESS NOTES
"Patient: Danny Arvizu    YOB: 1965    Date: 01/28/2022    Primary Care Provider: Navya Burns MD    Chief Complaint   Patient presents with   • Follow-up     excision neck       History of present illness:  I saw the patient in the office today as a followup from their recent lesion excision from 10/28/21. They state that the excision is red and swelling with orange drainage that started last week.  It is unusual that patient has surgery over 3 months ago with no problems.  Now appears to have a dermatitis and cellulitis over the wound.    Vital Signs:  Vitals:    01/28/22 1342   BP: 130/64   Pulse: 88   Resp: 18   Temp: 98.7 °F (37.1 °C)   TempSrc: Temporal   SpO2: 96%   Weight: 114 kg (251 lb)   Height: 175.3 cm (69\")       Physical Exam:   General Appearance:    Alert, cooperative, in no acute distress, wound clean dry without infection   Abdomen:     no masses, no organomegaly, soft non-tender, non-distended, no guarding, wounds are well healed   Chest:      Clear to ausculation  Neck-dermatitis around the wound.  Cellulitis present.  No fluctuance or drainage.       Assessment / Plan:    1. Cellulitis of neck        Patient placed on Augmentin 805 mg for 5 days.  Patient may shower and use Neosporin daily.  Follow-up in 2 weeks if incision not healed completely.    Electronically signed by Radha Jeter MD  01/28/22                      "

## 2022-05-23 ENCOUNTER — LAB (OUTPATIENT)
Dept: LAB | Facility: HOSPITAL | Age: 57
End: 2022-05-23

## 2022-05-23 ENCOUNTER — TRANSCRIBE ORDERS (OUTPATIENT)
Dept: LAB | Facility: HOSPITAL | Age: 57
End: 2022-05-23

## 2022-05-23 DIAGNOSIS — E03.8 TOXIC DIFFUSE GOITER WITH PRETIBIAL MYXEDEMA: ICD-10-CM

## 2022-05-23 DIAGNOSIS — R59.9 SWELLING OF LYMPH NODES: ICD-10-CM

## 2022-05-23 DIAGNOSIS — E78.2 MIXED HYPERLIPIDEMIA: Primary | ICD-10-CM

## 2022-05-23 DIAGNOSIS — E05.00 TOXIC DIFFUSE GOITER WITH PRETIBIAL MYXEDEMA: ICD-10-CM

## 2022-05-23 DIAGNOSIS — R73.9 BLOOD GLUCOSE ELEVATED: ICD-10-CM

## 2022-05-23 DIAGNOSIS — E55.9 VITAMIN D DEFICIENCY: ICD-10-CM

## 2022-05-23 DIAGNOSIS — E78.2 MIXED HYPERLIPIDEMIA: ICD-10-CM

## 2022-05-23 LAB
25(OH)D3 SERPL-MCNC: 46.7 NG/ML (ref 30–100)
ALBUMIN SERPL-MCNC: 4.5 G/DL (ref 3.5–5.2)
ALBUMIN/GLOB SERPL: 1.6 G/DL
ALP SERPL-CCNC: 85 U/L (ref 39–117)
ALT SERPL W P-5'-P-CCNC: 42 U/L (ref 1–33)
ANION GAP SERPL CALCULATED.3IONS-SCNC: 13.5 MMOL/L (ref 5–15)
AST SERPL-CCNC: 26 U/L (ref 1–32)
BILIRUB SERPL-MCNC: 0.2 MG/DL (ref 0–1.2)
BUN SERPL-MCNC: 16 MG/DL (ref 6–20)
BUN/CREAT SERPL: 17 (ref 7–25)
CALCIUM SPEC-SCNC: 9.4 MG/DL (ref 8.6–10.5)
CHLORIDE SERPL-SCNC: 104 MMOL/L (ref 98–107)
CO2 SERPL-SCNC: 23.5 MMOL/L (ref 22–29)
CREAT SERPL-MCNC: 0.94 MG/DL (ref 0.57–1)
CRP SERPL-MCNC: 0.45 MG/DL (ref 0–0.5)
DEPRECATED RDW RBC AUTO: 46.9 FL (ref 37–54)
EGFRCR SERPLBLD CKD-EPI 2021: 70.5 ML/MIN/1.73
ERYTHROCYTE [DISTWIDTH] IN BLOOD BY AUTOMATED COUNT: 14.6 % (ref 12.3–15.4)
ERYTHROCYTE [SEDIMENTATION RATE] IN BLOOD: 20 MM/HR (ref 0–30)
GLOBULIN UR ELPH-MCNC: 2.9 GM/DL
GLUCOSE SERPL-MCNC: 100 MG/DL (ref 65–99)
HBA1C MFR BLD: 6 % (ref 4.8–5.6)
HCT VFR BLD AUTO: 44.6 % (ref 34–46.6)
HGB BLD-MCNC: 14.3 G/DL (ref 12–15.9)
MCH RBC QN AUTO: 28.3 PG (ref 26.6–33)
MCHC RBC AUTO-ENTMCNC: 32.1 G/DL (ref 31.5–35.7)
MCV RBC AUTO: 88.1 FL (ref 79–97)
PLATELET # BLD AUTO: 228 10*3/MM3 (ref 140–450)
PMV BLD AUTO: 11.2 FL (ref 6–12)
POTASSIUM SERPL-SCNC: 4.2 MMOL/L (ref 3.5–5.2)
PROT SERPL-MCNC: 7.4 G/DL (ref 6–8.5)
RBC # BLD AUTO: 5.06 10*6/MM3 (ref 3.77–5.28)
SODIUM SERPL-SCNC: 141 MMOL/L (ref 136–145)
T4 FREE SERPL-MCNC: 1.2 NG/DL (ref 0.93–1.7)
TSH SERPL DL<=0.05 MIU/L-ACNC: 2.01 UIU/ML (ref 0.27–4.2)
WBC NRBC COR # BLD: 6.36 10*3/MM3 (ref 3.4–10.8)

## 2022-05-23 PROCEDURE — 86140 C-REACTIVE PROTEIN: CPT

## 2022-05-23 PROCEDURE — 85652 RBC SED RATE AUTOMATED: CPT

## 2022-05-23 PROCEDURE — 83036 HEMOGLOBIN GLYCOSYLATED A1C: CPT

## 2022-05-23 PROCEDURE — 84439 ASSAY OF FREE THYROXINE: CPT

## 2022-05-23 PROCEDURE — 80050 GENERAL HEALTH PANEL: CPT

## 2022-05-23 PROCEDURE — 82306 VITAMIN D 25 HYDROXY: CPT

## 2022-05-23 PROCEDURE — 36415 COLL VENOUS BLD VENIPUNCTURE: CPT

## 2022-05-24 ENCOUNTER — OFFICE VISIT (OUTPATIENT)
Dept: PULMONOLOGY | Facility: CLINIC | Age: 57
End: 2022-05-24

## 2022-05-24 VITALS
WEIGHT: 258 LBS | HEART RATE: 87 BPM | DIASTOLIC BLOOD PRESSURE: 72 MMHG | RESPIRATION RATE: 16 BRPM | SYSTOLIC BLOOD PRESSURE: 118 MMHG | HEIGHT: 69 IN | BODY MASS INDEX: 38.21 KG/M2 | OXYGEN SATURATION: 97 %

## 2022-05-24 DIAGNOSIS — G47.33 OSA (OBSTRUCTIVE SLEEP APNEA): Primary | ICD-10-CM

## 2022-05-24 DIAGNOSIS — E66.9 OBESITY (BMI 30-39.9): ICD-10-CM

## 2022-05-24 PROCEDURE — 99213 OFFICE O/P EST LOW 20 MIN: CPT | Performed by: INTERNAL MEDICINE

## 2022-05-24 RX ORDER — PRAVASTATIN SODIUM 40 MG
TABLET ORAL
COMMUNITY
Start: 2022-04-18

## 2022-05-24 RX ORDER — SEMAGLUTIDE 1.34 MG/ML
INJECTION, SOLUTION SUBCUTANEOUS
COMMUNITY
Start: 2022-05-10

## 2022-05-24 NOTE — PROGRESS NOTES
"  Chief Complaint   Patient presents with   • Follow-up   • Sleeping Problem       Subjective   Lasonia PARVIN Arvizu is a 56 y.o. female.     History of Present Illness   Patient was evaluated today for follow up of Sleep apnea.     Patient doesn't report any issues with the PAP device. The patient describes no significant issues with her mask either.     Patient says that the compliance with the use of the equipment is good.     Patient says that her symptoms of fatigue & daytime sleepiness have been helped greatly with the use of PAP, as prescribed.     The following portions of the patient's history were reviewed and updated as appropriate: allergies, current medications, past family history, past medical history, past social history and past surgical history.    Review of Systems   HENT: Negative for sinus pressure, sneezing and sore throat.    Respiratory: Negative for cough, chest tightness, shortness of breath and wheezing.        Objective   Visit Vitals  /72   Pulse 87   Resp 16   Ht 175.3 cm (69.02\")   Wt 117 kg (258 lb)   SpO2 97%   BMI 38.08 kg/m²       Physical Exam  Vitals reviewed.   HENT:      Head: Atraumatic.      Mouth/Throat:      Comments: Oropharynx was crowded.  Neck:      Comments: Increased neck circumference noted  Musculoskeletal:         General: No swelling.   Skin:     General: Skin is warm and dry.         Assessment & Plan   Diagnoses and all orders for this visit:    1. GINA (obstructive sleep apnea) (Primary)    2. Obesity (BMI 30-39.9)           Return in about 1 year (around 5/24/2023) for SleepONLY/Genie.    DISCUSSION (if any):  I reviewed the results of last home sleep study in detail. It was performed in 2019. I informed her that the apnea hypopnea index was 77 / hr. Supine AHI was 86/hour.      Continue treatment with AutoPAP at a pressure of 8/15, with nasal pillows.    Patient seems to be compliant with PAP device, based on the available data and her account of improved " symptoms.     Compliance data was obtained from the her device/DME company.    Sleep hygiene measures were discussed in great detail including need to follow a strict bedtime and to avoid electronic devices in bed and close to bedtime.    she was also asked to avoid caffeinated or alcoholic beverages within 4 to 6 hours of bedtime.    Patient is aware of the recall and she has registered the device with [id number].    Patient can look into adding inline filter and to consider changing it on a regular basis.      The patient was once again reminded to continue using the PAP device regularly, every night for atleast 4 hours.    I spent a total of 22 minutes face to face with this patient. Part of this time was spent in counseling patient about the pathophysiology of underlying disease process, reviewing any available sleep studies, need to use devices (as applicable) on a regular basis and lifestyle modifications that may positively impact patient's health.  Time also includes documentation in electronic health records.        Dictated utilizing Dragon dictation.    This document was electronically signed by Jef Calderon MD on 05/24/22 at 13:29 EDT

## 2022-08-11 ENCOUNTER — TRANSCRIBE ORDERS (OUTPATIENT)
Dept: ADMINISTRATIVE | Facility: HOSPITAL | Age: 57
End: 2022-08-11

## 2022-08-11 DIAGNOSIS — R59.9 ENLARGEMENT OF LYMPH NODE: Primary | ICD-10-CM

## 2022-08-23 ENCOUNTER — TRANSCRIBE ORDERS (OUTPATIENT)
Dept: ADMINISTRATIVE | Facility: HOSPITAL | Age: 57
End: 2022-08-23

## 2022-08-23 DIAGNOSIS — Z12.31 VISIT FOR SCREENING MAMMOGRAM: Primary | ICD-10-CM

## 2022-08-25 ENCOUNTER — HOSPITAL ENCOUNTER (OUTPATIENT)
Dept: CT IMAGING | Facility: HOSPITAL | Age: 57
Discharge: HOME | End: 2022-08-25
Admitting: FAMILY MEDICINE

## 2022-08-25 DIAGNOSIS — R59.9 ENLARGEMENT OF LYMPH NODE: ICD-10-CM

## 2022-08-25 PROCEDURE — 71250 CT THORAX DX C-: CPT

## 2022-09-30 ENCOUNTER — HOSPITAL ENCOUNTER (OUTPATIENT)
Dept: MAMMOGRAPHY | Facility: HOSPITAL | Age: 57
Discharge: HOME | End: 2022-09-30
Admitting: OBSTETRICS & GYNECOLOGY

## 2022-09-30 DIAGNOSIS — Z12.31 VISIT FOR SCREENING MAMMOGRAM: ICD-10-CM

## 2022-09-30 PROCEDURE — 77063 BREAST TOMOSYNTHESIS BI: CPT | Performed by: RADIOLOGY

## 2022-09-30 PROCEDURE — 77067 SCR MAMMO BI INCL CAD: CPT | Performed by: RADIOLOGY

## 2022-09-30 PROCEDURE — 77067 SCR MAMMO BI INCL CAD: CPT

## 2022-09-30 PROCEDURE — 77063 BREAST TOMOSYNTHESIS BI: CPT

## 2023-02-06 ENCOUNTER — LAB (OUTPATIENT)
Dept: LAB | Facility: HOSPITAL | Age: 58
End: 2023-02-06
Payer: COMMERCIAL

## 2023-02-06 ENCOUNTER — TRANSCRIBE ORDERS (OUTPATIENT)
Dept: LAB | Facility: HOSPITAL | Age: 58
End: 2023-02-06
Payer: COMMERCIAL

## 2023-02-06 DIAGNOSIS — E05.00 TOXIC DIFFUSE GOITER WITH PRETIBIAL MYXEDEMA: ICD-10-CM

## 2023-02-06 DIAGNOSIS — E55.9 VITAMIN D DEFICIENCY: ICD-10-CM

## 2023-02-06 DIAGNOSIS — R53.83 TIREDNESS: ICD-10-CM

## 2023-02-06 DIAGNOSIS — E03.8 TOXIC DIFFUSE GOITER WITH PRETIBIAL MYXEDEMA: ICD-10-CM

## 2023-02-06 DIAGNOSIS — Z00.00 ROUTINE GENERAL MEDICAL EXAMINATION AT A HEALTH CARE FACILITY: Primary | ICD-10-CM

## 2023-02-06 DIAGNOSIS — Z00.00 ROUTINE GENERAL MEDICAL EXAMINATION AT A HEALTH CARE FACILITY: ICD-10-CM

## 2023-02-06 LAB
25(OH)D3 SERPL-MCNC: 57 NG/ML (ref 30–100)
ALBUMIN SERPL-MCNC: 4.3 G/DL (ref 3.5–5.2)
ALBUMIN/GLOB SERPL: 1.6 G/DL
ALP SERPL-CCNC: 82 U/L (ref 39–117)
ALT SERPL W P-5'-P-CCNC: 24 U/L (ref 1–33)
ANION GAP SERPL CALCULATED.3IONS-SCNC: 6 MMOL/L (ref 5–15)
AST SERPL-CCNC: 18 U/L (ref 1–32)
BILIRUB SERPL-MCNC: 0.2 MG/DL (ref 0–1.2)
BUN SERPL-MCNC: 20 MG/DL (ref 6–20)
BUN/CREAT SERPL: 19 (ref 7–25)
CALCIUM SPEC-SCNC: 9.5 MG/DL (ref 8.6–10.5)
CHLORIDE SERPL-SCNC: 105 MMOL/L (ref 98–107)
CHOLEST SERPL-MCNC: 190 MG/DL (ref 0–200)
CO2 SERPL-SCNC: 29 MMOL/L (ref 22–29)
CREAT SERPL-MCNC: 1.05 MG/DL (ref 0.57–1)
DEPRECATED RDW RBC AUTO: 41.6 FL (ref 37–54)
EGFRCR SERPLBLD CKD-EPI 2021: 61.3 ML/MIN/1.73
ERYTHROCYTE [DISTWIDTH] IN BLOOD BY AUTOMATED COUNT: 13.4 % (ref 12.3–15.4)
GLOBULIN UR ELPH-MCNC: 2.7 GM/DL
GLUCOSE SERPL-MCNC: 107 MG/DL (ref 65–99)
HCT VFR BLD AUTO: 43.5 % (ref 34–46.6)
HDLC SERPL-MCNC: 39 MG/DL (ref 40–60)
HGB BLD-MCNC: 14.4 G/DL (ref 12–15.9)
IRON 24H UR-MRATE: 71 MCG/DL (ref 37–145)
IRON SATN MFR SERPL: 15 % (ref 20–50)
LDLC SERPL CALC-MCNC: 102 MG/DL (ref 0–100)
LDLC/HDLC SERPL: 2.38 {RATIO}
MCH RBC QN AUTO: 28 PG (ref 26.6–33)
MCHC RBC AUTO-ENTMCNC: 33.1 G/DL (ref 31.5–35.7)
MCV RBC AUTO: 84.6 FL (ref 79–97)
PLATELET # BLD AUTO: 243 10*3/MM3 (ref 140–450)
PMV BLD AUTO: 10.6 FL (ref 6–12)
POTASSIUM SERPL-SCNC: 4.6 MMOL/L (ref 3.5–5.2)
PROT SERPL-MCNC: 7 G/DL (ref 6–8.5)
RBC # BLD AUTO: 5.14 10*6/MM3 (ref 3.77–5.28)
SODIUM SERPL-SCNC: 140 MMOL/L (ref 136–145)
T4 FREE SERPL-MCNC: 1 NG/DL (ref 0.93–1.7)
TIBC SERPL-MCNC: 469 MCG/DL (ref 298–536)
TRANSFERRIN SERPL-MCNC: 315 MG/DL (ref 200–360)
TRIGL SERPL-MCNC: 291 MG/DL (ref 0–150)
TSH SERPL DL<=0.05 MIU/L-ACNC: 1.96 UIU/ML (ref 0.27–4.2)
VIT B12 BLD-MCNC: 424 PG/ML (ref 211–946)
VLDLC SERPL-MCNC: 49 MG/DL (ref 5–40)
WBC NRBC COR # BLD: 6.02 10*3/MM3 (ref 3.4–10.8)

## 2023-02-06 PROCEDURE — 82607 VITAMIN B-12: CPT

## 2023-02-06 PROCEDURE — 84443 ASSAY THYROID STIM HORMONE: CPT

## 2023-02-06 PROCEDURE — 83540 ASSAY OF IRON: CPT

## 2023-02-06 PROCEDURE — 36415 COLL VENOUS BLD VENIPUNCTURE: CPT

## 2023-02-06 PROCEDURE — 80053 COMPREHEN METABOLIC PANEL: CPT

## 2023-02-06 PROCEDURE — 82306 VITAMIN D 25 HYDROXY: CPT

## 2023-02-06 PROCEDURE — 85027 COMPLETE CBC AUTOMATED: CPT

## 2023-02-06 PROCEDURE — 80061 LIPID PANEL: CPT

## 2023-02-06 PROCEDURE — 84466 ASSAY OF TRANSFERRIN: CPT

## 2023-02-06 PROCEDURE — 84439 ASSAY OF FREE THYROXINE: CPT

## 2023-05-05 ENCOUNTER — HOSPITAL ENCOUNTER (EMERGENCY)
Facility: HOSPITAL | Age: 58
Discharge: HOME | End: 2023-05-05
Attending: EMERGENCY MEDICINE
Payer: COMMERCIAL

## 2023-05-05 VITALS
HEART RATE: 80 BPM | RESPIRATION RATE: 18 BRPM | WEIGHT: 258 LBS | HEIGHT: 69 IN | DIASTOLIC BLOOD PRESSURE: 71 MMHG | BODY MASS INDEX: 38.21 KG/M2 | OXYGEN SATURATION: 96 % | TEMPERATURE: 97.7 F | SYSTOLIC BLOOD PRESSURE: 151 MMHG

## 2023-05-05 DIAGNOSIS — T50.905A MEDICATION REACTION, INITIAL ENCOUNTER: Primary | ICD-10-CM

## 2023-05-05 PROCEDURE — 63710000001 PREDNISONE PER 1 MG: Performed by: EMERGENCY MEDICINE

## 2023-05-05 PROCEDURE — 99283 EMERGENCY DEPT VISIT LOW MDM: CPT

## 2023-05-05 RX ORDER — PREDNISONE 20 MG/1
40 TABLET ORAL DAILY
Qty: 10 TABLET | Refills: 0 | Status: SHIPPED | OUTPATIENT
Start: 2023-05-05 | End: 2023-05-10

## 2023-05-05 RX ORDER — HYDROCODONE BITARTRATE AND ACETAMINOPHEN 5; 325 MG/1; MG/1
1 TABLET ORAL ONCE
Status: COMPLETED | OUTPATIENT
Start: 2023-05-05 | End: 2023-05-05

## 2023-05-05 RX ORDER — PREDNISONE 20 MG/1
40 TABLET ORAL ONCE
Status: COMPLETED | OUTPATIENT
Start: 2023-05-05 | End: 2023-05-05

## 2023-05-05 RX ORDER — GINSENG 100 MG
1 CAPSULE ORAL 2 TIMES DAILY
Qty: 14 G | Refills: 0 | Status: SHIPPED | OUTPATIENT
Start: 2023-05-05

## 2023-05-05 RX ADMIN — PREDNISONE 40 MG: 20 TABLET ORAL at 21:22

## 2023-05-05 RX ADMIN — HYDROCODONE BITARTRATE AND ACETAMINOPHEN 1 TABLET: 5; 325 TABLET ORAL at 21:22

## 2023-05-06 NOTE — ED PROVIDER NOTES
Subjective  History of Present Illness:    Chief Complaint:   Chief Complaint   Patient presents with   • Rash   • Blister   • Allergic Reaction      History of Present Illness: Danny Arvizu is a 57 y.o. female who presents to the emergency department complaining of blister to the abdominal wall after injection of a medication.  Patient states she has been taking semaglutide over the past couple weeks for weight loss and has had 2 doses over the 2 weeks prior and took her third dose today at 1500 hrs.  Shortly thereafter she started having burning and pain redness and blistering to her abdominal wall.  No respiratory complaints.  No other complaints.  Took Zyrtec prior to arrival onset: 6 hours ago  Duration: Ongoing, worsening  Exacerbating / Alleviating factors: None  Associated symptoms: None      Nurses Notes reviewed and agree, including vitals, allergies, social history and prior medical history.     Review of Systems   Constitutional: Negative.    HENT: Negative.    Eyes: Negative.    Respiratory: Negative.    Cardiovascular: Negative.    Gastrointestinal: Negative.    Genitourinary: Negative.    Musculoskeletal: Negative.    Skin:        Local allergic reaction abdominal wall   Neurological: Negative.    Psychiatric/Behavioral: Negative.        Past Medical History:   Diagnosis Date   • Anxiety and depression    • Body piercing     EARS   • C. difficile diarrhea 02/21/2018    REPORTS WAS TREATED   • COVID-19 vaccine series completed     pfizer   • Diabetes mellitus     borderline, currently taking metformin   • Disease of thyroid gland    • GERD (gastroesophageal reflux disease)    • High cholesterol    • History of pneumonia    • PONV (postoperative nausea and vomiting)    • Seasonal allergies    • Sinus problem    • Sleep apnea     CPAP machine   • Wears glasses        Allergies:    Demerol [meperidine] and Tetracyclines & related      Past Surgical History:   Procedure Laterality Date   • CERVICAL  "LYMPH NODE BIOPSY/EXCISION Right 10/28/2021    Procedure: EXCISION RIGHT NECK LYMPH NODE;  Surgeon: Radha Jeter MD;  Location: [facility] OR;  Service: General;  Laterality: Right;   • COLONOSCOPY N/A 04/13/2018    Procedure: COLONOSCOPY;  Surgeon: Radha Jeter MD;  Location: [facility] ENDOSCOPY;  Service: Gastroenterology   • DILATATION AND CURETTAGE     • FINGER SURGERY Right     INDEX FINGER   • FOOT SURGERY Left    • HYSTEROSCOPY ENDOMETRIAL ABLATION     • LYMPH NODE DISSECTION     • OTHER SURGICAL HISTORY Left     REPORTS HAD A BIRTH JULIANNE REMOVED FROM LEFT ARM AS A TEENAGER   • US GUIDED LYMPH NODE BIOPSY  09/21/2021         Social History     Socioeconomic History   • Marital status:    Tobacco Use   • Smoking status: Never   • Smokeless tobacco: Never   Vaping Use   • Vaping Use: Never used   Substance and Sexual Activity   • Alcohol use: Yes     Comment: REPORTS SOCIAL USE ONLY, NO HX OF ABUSE   • Drug use: No   • Sexual activity: Defer         Family History   Problem Relation Age of Onset   • Heart attack Father    • Hypertension Father    • Heart attack Other         Grandmother & Uncles   • Cancer Other         Grandmother   • Hypertension Other         Grandparents   • Breast cancer Cousin 35   • Ovarian cancer Neg Hx        Objective  Physical Exam:  /80 (BP Location: Left arm)   Pulse 84   Temp 98.2 °F (36.8 °C) (Oral)   Resp 18   Ht 175.3 cm (69\")   Wt 117 kg (258 lb)   SpO2 96%   BMI 38.10 kg/m²      Physical Exam  Vitals and nursing note reviewed.   Constitutional:       General: She is not in acute distress.     Appearance: Normal appearance. She is obese. She is not ill-appearing, toxic-appearing or diaphoretic.   HENT:      Head: Normocephalic and atraumatic.      Nose: Nose normal.   Eyes:      Extraocular Movements: Extraocular movements intact.   Cardiovascular:      Rate and Rhythm: Normal rate and regular rhythm.   Pulmonary:      Effort: Pulmonary effort is normal. "   Abdominal:      General: Abdomen is flat.      Palpations: Abdomen is soft.   Musculoskeletal:         General: Normal range of motion.      Cervical back: Normal range of motion.   Skin:     Comments: Large area of erythema to the right abdomen approximately 6 to 8 inches in diameter with some serous blisters in the center.   Neurological:      General: No focal deficit present.      Mental Status: She is alert and oriented to person, place, and time. Mental status is at baseline.   Psychiatric:         Mood and Affect: Mood normal.         Behavior: Behavior normal.           Procedures    ED Course:         Lab Results (last 24 hours)     ** No results found for the last 24 hours. **           No radiology results from the last 24 hrs       MDM    Danny Arvizu is a 57 y.o. female who presents to the emergency department for evaluation of local allergic reaction to medication injected into her abdominal wall    Differential diagnosis includes local allergic reaction among other etiologies.    Chart review if available included outside testing, previous visits, prior labs, prior imaging, available notes from prior evaluations or visits with specialists, medication list, allergies, past medical history, past surgical history when applicable.    Patient was treated with prednisone and Norco    Patient already took Zyrtec prior to arrival, does not like to take Benadryl.  We will add prednisone give her dose of pain medication here and have her take Tylenol at home for pain stop medication follow-up with PCP    Plan for disposition is discharged home.  Patient/family comfortable with and understanding of the plan.      Final diagnoses:   None        Albin Nahs PA-C  05/05/23 8029

## 2023-05-06 NOTE — DISCHARGE INSTRUCTIONS
Please attempt to keep the blisters intact in your abdomen and do not pop them.  If they do pop please begin the antibiotic ointment.  Please take the steroids daily and daily Zyrtec.  Stop your semaglutide and follow-up with your PCP Monday.  Return to the ER for any new or worsening symptoms

## 2023-05-18 ENCOUNTER — OFFICE VISIT (OUTPATIENT)
Dept: PULMONOLOGY | Facility: CLINIC | Age: 58
End: 2023-05-18
Payer: MEDICARE

## 2023-05-18 VITALS
BODY MASS INDEX: 38.09 KG/M2 | RESPIRATION RATE: 18 BRPM | DIASTOLIC BLOOD PRESSURE: 79 MMHG | OXYGEN SATURATION: 96 % | SYSTOLIC BLOOD PRESSURE: 136 MMHG | HEIGHT: 69 IN | WEIGHT: 257.2 LBS | HEART RATE: 78 BPM

## 2023-05-18 DIAGNOSIS — G47.33 OSA (OBSTRUCTIVE SLEEP APNEA): Primary | ICD-10-CM

## 2023-05-18 DIAGNOSIS — E66.9 OBESITY (BMI 30-39.9): ICD-10-CM

## 2023-05-18 NOTE — PROGRESS NOTES
"Chief Complaint   Patient presents with   • Sleeping Problem   • Follow-up         Subjective   Lasonia PARVIN Arvizu is a 57 y.o. female.     History of Present Illness   Patient comes back today for follow up of Obstructive Sleep apnea.     Patient says that she is compliant with her device and using it regularly.    Patient's symptoms of sleep disturbance and daytime sleepiness have been helped greatly with the use of PAP device, as prescribed. She feels rested most days upon awakening.       The following portions of the patient's history were reviewed and updated as appropriate: allergies, current medications, past family history, past medical history, past social history and past surgical history.      Review of Systems   HENT: Negative for sinus pressure, sneezing and sore throat.    Respiratory: Negative for cough, chest tightness, shortness of breath and wheezing.    Psychiatric/Behavioral: Negative for sleep disturbance.       Objective   Visit Vitals  /79   Pulse 78   Resp 18   Ht 175.3 cm (69.02\")   Wt 117 kg (257 lb 3.2 oz)   SpO2 96%   BMI 37.96 kg/m²         Physical Exam  Vitals reviewed.   Constitutional:       Appearance: She is well-developed.   HENT:      Head: Atraumatic.      Mouth/Throat:      Mouth: Mucous membranes are moist.      Comments: Crowded oropharynx.   Eyes:      Extraocular Movements: Extraocular movements intact.   Cardiovascular:      Rate and Rhythm: Normal rate and regular rhythm.   Abdominal:      Comments: Obese abdomen.    Musculoskeletal:      Comments: Gait normal.    Skin:     General: Skin is warm.   Neurological:      Mental Status: She is alert and oriented to person, place, and time.         Assessment & Plan   Diagnoses and all orders for this visit:    1. GINA (obstructive sleep apnea) (Primary)    2. Obesity (BMI 30-39.9)           Return in about 13 months (around 6/18/2024) for Recheck, For Dr. Calderon, Sleep ONLY.    DISCUSSION (if any):  I reviewed the results " of last home sleep study in detail. It was performed in 2019. I informed her that the apnea hypopnea index was 77 / hr. Supine AHI was 86/hour.       Continue treatment with AutoPAP at a pressure of 8/15, with a nasal mask.    Patient's compliance data was reviewed and the compliance is greater than 90%.    Humidification setup, hose and mask care discussed.    Weight loss advised.    Use every night for at least 4 hours stressed.    Dictated utilizing Dragon dictation.    This document was electronically signed by BRAIN Lacy May 18, 2023  14:36 EDT

## 2023-09-29 ENCOUNTER — TRANSCRIBE ORDERS (OUTPATIENT)
Dept: ADMINISTRATIVE | Facility: HOSPITAL | Age: 58
End: 2023-09-29
Payer: COMMERCIAL

## 2023-09-29 DIAGNOSIS — Z12.31 VISIT FOR SCREENING MAMMOGRAM: Primary | ICD-10-CM

## 2023-10-13 ENCOUNTER — HOSPITAL ENCOUNTER (OUTPATIENT)
Dept: MAMMOGRAPHY | Facility: HOSPITAL | Age: 58
Discharge: HOME | End: 2023-10-13
Admitting: OBSTETRICS & GYNECOLOGY
Payer: MEDICARE

## 2023-10-13 DIAGNOSIS — Z12.31 VISIT FOR SCREENING MAMMOGRAM: ICD-10-CM

## 2023-10-13 PROCEDURE — 77063 BREAST TOMOSYNTHESIS BI: CPT

## 2023-10-13 PROCEDURE — 77067 SCR MAMMO BI INCL CAD: CPT

## 2023-11-27 ENCOUNTER — TRANSCRIBE ORDERS (OUTPATIENT)
Dept: LAB | Facility: HOSPITAL | Age: 58
End: 2023-11-27
Payer: COMMERCIAL

## 2023-11-27 ENCOUNTER — LAB (OUTPATIENT)
Dept: LAB | Facility: HOSPITAL | Age: 58
End: 2023-11-27
Payer: MEDICARE

## 2023-11-27 DIAGNOSIS — E03.8 TOXIC DIFFUSE GOITER WITH PRETIBIAL MYXEDEMA: ICD-10-CM

## 2023-11-27 DIAGNOSIS — D50.0 IRON DEFICIENCY ANEMIA SECONDARY TO BLOOD LOSS (CHRONIC): ICD-10-CM

## 2023-11-27 DIAGNOSIS — E53.8 BIOTIN-(PROPIONYL-COA-CARBOXYLASE) LIGASE DEFICIENCY: ICD-10-CM

## 2023-11-27 DIAGNOSIS — E05.00 TOXIC DIFFUSE GOITER WITH PRETIBIAL MYXEDEMA: ICD-10-CM

## 2023-11-27 DIAGNOSIS — Z00.00 ROUTINE GENERAL MEDICAL EXAMINATION AT A HEALTH CARE FACILITY: ICD-10-CM

## 2023-11-27 DIAGNOSIS — Z00.00 ROUTINE GENERAL MEDICAL EXAMINATION AT A HEALTH CARE FACILITY: Primary | ICD-10-CM

## 2023-11-27 DIAGNOSIS — R73.9 BLOOD GLUCOSE ELEVATED: ICD-10-CM

## 2023-11-27 DIAGNOSIS — E55.9 VITAMIN D2 DEFICIENCY: ICD-10-CM

## 2023-11-27 LAB
25(OH)D3 SERPL-MCNC: 66.3 NG/ML (ref 30–100)
ALBUMIN SERPL-MCNC: 4.4 G/DL (ref 3.5–5.2)
ALBUMIN/GLOB SERPL: 1.5 G/DL
ALP SERPL-CCNC: 79 U/L (ref 39–117)
ALT SERPL W P-5'-P-CCNC: 30 U/L (ref 1–33)
ANION GAP SERPL CALCULATED.3IONS-SCNC: 11.9 MMOL/L (ref 5–15)
AST SERPL-CCNC: 24 U/L (ref 1–32)
BILIRUB SERPL-MCNC: 0.2 MG/DL (ref 0–1.2)
BUN SERPL-MCNC: 14 MG/DL (ref 8–23)
BUN/CREAT SERPL: 14.3 (ref 7–25)
CALCIUM SPEC-SCNC: 9.7 MG/DL (ref 8.6–10.5)
CHLORIDE SERPL-SCNC: 102 MMOL/L (ref 98–107)
CO2 SERPL-SCNC: 25.1 MMOL/L (ref 22–29)
CREAT SERPL-MCNC: 0.98 MG/DL (ref 0.57–1)
DEPRECATED RDW RBC AUTO: 43.1 FL (ref 37–54)
EGFRCR SERPLBLD CKD-EPI 2021: 66.2 ML/MIN/1.73
ERYTHROCYTE [DISTWIDTH] IN BLOOD BY AUTOMATED COUNT: 14.2 % (ref 12.3–15.4)
GLOBULIN UR ELPH-MCNC: 3 GM/DL
GLUCOSE SERPL-MCNC: 112 MG/DL (ref 65–99)
HBA1C MFR BLD: 6.3 % (ref 4.8–5.6)
HCT VFR BLD AUTO: 41.9 % (ref 34–46.6)
HGB BLD-MCNC: 13.9 G/DL (ref 12–15.9)
IRON 24H UR-MRATE: 56 MCG/DL (ref 37–145)
IRON SATN MFR SERPL: 13 % (ref 20–50)
MCH RBC QN AUTO: 28.3 PG (ref 26.6–33)
MCHC RBC AUTO-ENTMCNC: 33.2 G/DL (ref 31.5–35.7)
MCV RBC AUTO: 85.3 FL (ref 79–97)
PLATELET # BLD AUTO: 244 10*3/MM3 (ref 140–450)
PMV BLD AUTO: 11.1 FL (ref 6–12)
POTASSIUM SERPL-SCNC: 4.5 MMOL/L (ref 3.5–5.2)
PROT SERPL-MCNC: 7.4 G/DL (ref 6–8.5)
RBC # BLD AUTO: 4.91 10*6/MM3 (ref 3.77–5.28)
SODIUM SERPL-SCNC: 139 MMOL/L (ref 136–145)
T4 FREE SERPL-MCNC: 1.18 NG/DL (ref 0.93–1.7)
TIBC SERPL-MCNC: 441 MCG/DL (ref 298–536)
TRANSFERRIN SERPL-MCNC: 296 MG/DL (ref 200–360)
TSH SERPL DL<=0.05 MIU/L-ACNC: 2.61 UIU/ML (ref 0.27–4.2)
VIT B12 BLD-MCNC: 442 PG/ML (ref 211–946)
WBC NRBC COR # BLD AUTO: 6.44 10*3/MM3 (ref 3.4–10.8)

## 2023-11-27 PROCEDURE — 83540 ASSAY OF IRON: CPT

## 2023-11-27 PROCEDURE — 84443 ASSAY THYROID STIM HORMONE: CPT

## 2023-11-27 PROCEDURE — 82607 VITAMIN B-12: CPT

## 2023-11-27 PROCEDURE — 84466 ASSAY OF TRANSFERRIN: CPT

## 2023-11-27 PROCEDURE — 84439 ASSAY OF FREE THYROXINE: CPT

## 2023-11-27 PROCEDURE — 36415 COLL VENOUS BLD VENIPUNCTURE: CPT

## 2023-11-27 PROCEDURE — 80053 COMPREHEN METABOLIC PANEL: CPT

## 2023-11-27 PROCEDURE — 82306 VITAMIN D 25 HYDROXY: CPT

## 2023-11-27 PROCEDURE — 85027 COMPLETE CBC AUTOMATED: CPT

## 2023-11-27 PROCEDURE — 83036 HEMOGLOBIN GLYCOSYLATED A1C: CPT

## 2024-06-03 ENCOUNTER — TRANSCRIBE ORDERS (OUTPATIENT)
Dept: LAB | Facility: HOSPITAL | Age: 59
End: 2024-06-03
Payer: MEDICARE

## 2024-06-03 ENCOUNTER — LAB (OUTPATIENT)
Dept: LAB | Facility: HOSPITAL | Age: 59
End: 2024-06-03
Payer: MEDICARE

## 2024-06-03 DIAGNOSIS — E55.9 AVITAMINOSIS D: ICD-10-CM

## 2024-06-03 DIAGNOSIS — E53.8 BIOTIN-(PROPIONYL-COA-CARBOXYLASE) LIGASE DEFICIENCY: ICD-10-CM

## 2024-06-03 DIAGNOSIS — R73.9 BLOOD GLUCOSE ELEVATED: ICD-10-CM

## 2024-06-03 DIAGNOSIS — E78.2 MIXED HYPERLIPIDEMIA: ICD-10-CM

## 2024-06-03 DIAGNOSIS — D50.0 IRON DEFICIENCY ANEMIA SECONDARY TO BLOOD LOSS (CHRONIC): ICD-10-CM

## 2024-06-03 DIAGNOSIS — E03.8 SECONDARY HYPOTHYROIDISM: ICD-10-CM

## 2024-06-03 DIAGNOSIS — E78.2 MIXED HYPERLIPIDEMIA: Primary | ICD-10-CM

## 2024-06-03 LAB
25(OH)D3 SERPL-MCNC: 60.3 NG/ML (ref 30–100)
ALBUMIN SERPL-MCNC: 4.4 G/DL (ref 3.5–5.2)
ALBUMIN/GLOB SERPL: 1.4 G/DL
ALP SERPL-CCNC: 83 U/L (ref 39–117)
ALT SERPL W P-5'-P-CCNC: 32 U/L (ref 1–33)
ANION GAP SERPL CALCULATED.3IONS-SCNC: 12.3 MMOL/L (ref 5–15)
AST SERPL-CCNC: 21 U/L (ref 1–32)
BILIRUB SERPL-MCNC: 0.2 MG/DL (ref 0–1.2)
BUN SERPL-MCNC: 16 MG/DL (ref 8–23)
BUN/CREAT SERPL: 15.2 (ref 7–25)
CALCIUM SPEC-SCNC: 9.7 MG/DL (ref 8.6–10.5)
CHLORIDE SERPL-SCNC: 103 MMOL/L (ref 98–107)
CHOLEST SERPL-MCNC: 222 MG/DL (ref 0–200)
CO2 SERPL-SCNC: 23.7 MMOL/L (ref 22–29)
CREAT SERPL-MCNC: 1.05 MG/DL (ref 0.57–1)
DEPRECATED RDW RBC AUTO: 42.9 FL (ref 37–54)
EGFRCR SERPLBLD CKD-EPI 2021: 61 ML/MIN/1.73
ERYTHROCYTE [DISTWIDTH] IN BLOOD BY AUTOMATED COUNT: 14 % (ref 12.3–15.4)
GLOBULIN UR ELPH-MCNC: 3.2 GM/DL
GLUCOSE SERPL-MCNC: 103 MG/DL (ref 65–99)
HBA1C MFR BLD: 6.4 % (ref 4.8–5.6)
HCT VFR BLD AUTO: 41.2 % (ref 34–46.6)
HDLC SERPL-MCNC: 42 MG/DL (ref 40–60)
HGB BLD-MCNC: 13.5 G/DL (ref 12–15.9)
IRON 24H UR-MRATE: 79 MCG/DL (ref 37–145)
IRON SATN MFR SERPL: 17 % (ref 20–50)
LDLC SERPL CALC-MCNC: 135 MG/DL (ref 0–100)
LDLC/HDLC SERPL: 3.1 {RATIO}
MCH RBC QN AUTO: 28.2 PG (ref 26.6–33)
MCHC RBC AUTO-ENTMCNC: 32.8 G/DL (ref 31.5–35.7)
MCV RBC AUTO: 86 FL (ref 79–97)
PLATELET # BLD AUTO: 221 10*3/MM3 (ref 140–450)
PMV BLD AUTO: 10.6 FL (ref 6–12)
POTASSIUM SERPL-SCNC: 4.1 MMOL/L (ref 3.5–5.2)
PROT SERPL-MCNC: 7.6 G/DL (ref 6–8.5)
RBC # BLD AUTO: 4.79 10*6/MM3 (ref 3.77–5.28)
SODIUM SERPL-SCNC: 139 MMOL/L (ref 136–145)
T4 FREE SERPL-MCNC: 1.08 NG/DL (ref 0.92–1.68)
TIBC SERPL-MCNC: 471 MCG/DL (ref 298–536)
TRANSFERRIN SERPL-MCNC: 316 MG/DL (ref 200–360)
TRIGL SERPL-MCNC: 250 MG/DL (ref 0–150)
TSH SERPL DL<=0.05 MIU/L-ACNC: 2.03 UIU/ML (ref 0.27–4.2)
VIT B12 BLD-MCNC: 400 PG/ML (ref 211–946)
VLDLC SERPL-MCNC: 45 MG/DL (ref 5–40)
WBC NRBC COR # BLD AUTO: 6.16 10*3/MM3 (ref 3.4–10.8)

## 2024-06-03 PROCEDURE — 84466 ASSAY OF TRANSFERRIN: CPT

## 2024-06-03 PROCEDURE — 83540 ASSAY OF IRON: CPT

## 2024-06-03 PROCEDURE — 84443 ASSAY THYROID STIM HORMONE: CPT

## 2024-06-03 PROCEDURE — 80061 LIPID PANEL: CPT

## 2024-06-03 PROCEDURE — 84439 ASSAY OF FREE THYROXINE: CPT

## 2024-06-03 PROCEDURE — 83036 HEMOGLOBIN GLYCOSYLATED A1C: CPT

## 2024-06-03 PROCEDURE — 80053 COMPREHEN METABOLIC PANEL: CPT

## 2024-06-03 PROCEDURE — 36415 COLL VENOUS BLD VENIPUNCTURE: CPT

## 2024-06-03 PROCEDURE — 85027 COMPLETE CBC AUTOMATED: CPT

## 2024-06-03 PROCEDURE — 82607 VITAMIN B-12: CPT

## 2024-06-03 PROCEDURE — 82306 VITAMIN D 25 HYDROXY: CPT

## 2024-06-10 ENCOUNTER — TRANSCRIBE ORDERS (OUTPATIENT)
Dept: ADMINISTRATIVE | Facility: HOSPITAL | Age: 59
End: 2024-06-10
Payer: MEDICARE

## 2024-06-10 DIAGNOSIS — Z12.31 SCREENING MAMMOGRAM FOR BREAST CANCER: Primary | ICD-10-CM

## 2024-07-05 ENCOUNTER — OFFICE VISIT (OUTPATIENT)
Dept: PULMONOLOGY | Facility: CLINIC | Age: 59
End: 2024-07-05
Payer: MEDICARE

## 2024-07-05 VITALS
HEIGHT: 69 IN | WEIGHT: 261 LBS | BODY MASS INDEX: 38.66 KG/M2 | RESPIRATION RATE: 14 BRPM | OXYGEN SATURATION: 94 % | SYSTOLIC BLOOD PRESSURE: 124 MMHG | DIASTOLIC BLOOD PRESSURE: 76 MMHG | HEART RATE: 83 BPM

## 2024-07-05 DIAGNOSIS — G47.33 OBSTRUCTIVE SLEEP APNEA: Primary | ICD-10-CM

## 2024-07-05 DIAGNOSIS — E66.9 OBESITY (BMI 30-39.9): ICD-10-CM

## 2024-07-05 DIAGNOSIS — J30.89 OTHER ALLERGIC RHINITIS: ICD-10-CM

## 2024-07-05 PROCEDURE — 99214 OFFICE O/P EST MOD 30 MIN: CPT | Performed by: INTERNAL MEDICINE

## 2024-07-05 RX ORDER — FLUTICASONE PROPIONATE 50 MCG
1 SPRAY, SUSPENSION (ML) NASAL DAILY
Qty: 16 G | Refills: 5 | Status: SHIPPED | OUTPATIENT
Start: 2024-07-05

## 2024-07-05 NOTE — PROGRESS NOTES
"  Chief Complaint   Patient presents with    Sleeping Problem    Follow-up       Subjective   Danny Arvizu is a 58 y.o. female.   Patient was evaluated today for follow up of Sleep apnea & allergic rhinitis.     Patient doesn't report any issues with the PAP device. The patient describes no significant issues with her mask either.     Patient says that the compliance with the use of the equipment is good.     Patient says that her symptoms of fatigue & daytime sleepiness have been helped greatly with the use of PAP, as prescribed.     she had a recalled device and was given a replacement device instead of the recalled device in June 2023.     Patient says that she has been using her nasal sprays on a seasonal basis and describes no significant ongoing issues other than occasional congestion.       The following portions of the patient's history were reviewed and updated as appropriate: allergies, current medications, past family history, past medical history, past social history, and past surgical history.    Review of Systems   HENT:  Negative for sinus pressure, sneezing and sore throat.    Respiratory:  Negative for cough, chest tightness, shortness of breath and wheezing.        Objective   Visit Vitals  /76   Pulse 83   Resp 14   Ht 175.3 cm (69\") Comment: pt reported   Wt 118 kg (261 lb)   LMP 12/01/2017 (Within Weeks)   SpO2 94%   BMI 38.54 kg/m²       BMI Readings from Last 8 Encounters:   07/05/24 38.54 kg/m²   05/18/23 37.96 kg/m²   05/05/23 38.10 kg/m²   05/24/22 38.08 kg/m²   01/28/22 37.07 kg/m²   11/03/21 37.11 kg/m²   10/26/21 37.13 kg/m²   10/25/21 36.92 kg/m²       Physical Exam  Vitals reviewed.   Constitutional:       Appearance: She is well-developed.   HENT:      Head: Atraumatic.      Nose:      Comments: Nasal erythema noted.     Mouth/Throat:      Comments: Oropharynx was crowded.  Neurological:      Mental Status: She is alert and oriented to person, place, and time. "           Assessment & Plan   Diagnoses and all orders for this visit:    1. Obstructive sleep apnea (Primary)    2. Obesity (BMI 30-39.9)    3. Other allergic rhinitis    Other orders  -     fluticasone (FLONASE) 50 MCG/ACT nasal spray; 1 spray into the nostril(s) as directed by provider Daily. Administer 1 spray in each nostril for each dose.  Dispense: 16 g; Refill: 5           Return in about 14 months (around 8/21/2025) for SleepONLY/Genie.    DISCUSSION (if any):  Sleep study performed in Nov 2019  AHI was 77 / hour.   Supine AHI was 86/hour.     Latest PAP device provided in June 2023  DME company: [employer]    Current PAP settings: 8-15  Current mask type: Nasal Pillows.    Compliance data was obtained from the her device/DME company.    Continue PAP device on a regular basis, at least 4 hours or more per night.    Sleep hygiene measures were discussed    Weight loss advised.    Patient was advised to continue her nasal spray on a seasonal basis, since her symptoms are consistent with seasonal allergic rhinitis.      Dictated utilizing Dragon dictation.    This document was electronically signed by Jef Calderon MD on 07/05/24 at 11:28 EDT

## 2024-10-28 ENCOUNTER — HOSPITAL ENCOUNTER (OUTPATIENT)
Dept: MAMMOGRAPHY | Facility: HOSPITAL | Age: 59
Discharge: HOME | End: 2024-10-28
Admitting: FAMILY MEDICINE
Payer: MEDICARE

## 2024-10-28 DIAGNOSIS — Z12.31 SCREENING MAMMOGRAM FOR BREAST CANCER: ICD-10-CM

## 2024-10-28 PROCEDURE — 77063 BREAST TOMOSYNTHESIS BI: CPT

## 2024-10-28 PROCEDURE — 77067 SCR MAMMO BI INCL CAD: CPT

## 2024-12-05 ENCOUNTER — TRANSCRIBE ORDERS (OUTPATIENT)
Dept: LAB | Facility: HOSPITAL | Age: 59
End: 2024-12-05
Payer: MEDICARE

## 2024-12-05 ENCOUNTER — LAB (OUTPATIENT)
Dept: LAB | Facility: HOSPITAL | Age: 59
End: 2024-12-05
Payer: MEDICARE

## 2024-12-05 DIAGNOSIS — E11.9 DIABETES MELLITUS WITHOUT COMPLICATION: ICD-10-CM

## 2024-12-05 DIAGNOSIS — E53.8 BIOTIN-(PROPIONYL-COA-CARBOXYLASE) LIGASE DEFICIENCY: ICD-10-CM

## 2024-12-05 DIAGNOSIS — E55.9 VITAMIN D DEFICIENCY: ICD-10-CM

## 2024-12-05 DIAGNOSIS — I10 ESSENTIAL HYPERTENSION, MALIGNANT: ICD-10-CM

## 2024-12-05 DIAGNOSIS — I10 ESSENTIAL HYPERTENSION, MALIGNANT: Primary | ICD-10-CM

## 2024-12-05 LAB
25(OH)D3 SERPL-MCNC: 62.9 NG/ML (ref 30–100)
ALBUMIN SERPL-MCNC: 4.3 G/DL (ref 3.5–5.2)
ALBUMIN/GLOB SERPL: 1.3 G/DL
ALP SERPL-CCNC: 83 U/L (ref 39–117)
ALT SERPL W P-5'-P-CCNC: 30 U/L (ref 1–33)
ANION GAP SERPL CALCULATED.3IONS-SCNC: 9 MMOL/L (ref 5–15)
AST SERPL-CCNC: 22 U/L (ref 1–32)
BILIRUB SERPL-MCNC: 0.3 MG/DL (ref 0–1.2)
BUN SERPL-MCNC: 14 MG/DL (ref 8–23)
BUN/CREAT SERPL: 12.3 (ref 7–25)
CALCIUM SPEC-SCNC: 9.7 MG/DL (ref 8.6–10.5)
CHLORIDE SERPL-SCNC: 103 MMOL/L (ref 98–107)
CO2 SERPL-SCNC: 25 MMOL/L (ref 22–29)
CREAT SERPL-MCNC: 1.14 MG/DL (ref 0.57–1)
CREAT UR-MCNC: 42.2 MG/DL
DEPRECATED RDW RBC AUTO: 42.9 FL (ref 37–54)
EGFRCR SERPLBLD CKD-EPI 2021: 54.9 ML/MIN/1.73
ERYTHROCYTE [DISTWIDTH] IN BLOOD BY AUTOMATED COUNT: 13.7 % (ref 12.3–15.4)
GLOBULIN UR ELPH-MCNC: 3.4 GM/DL
GLUCOSE SERPL-MCNC: 85 MG/DL (ref 65–99)
HBA1C MFR BLD: 5.9 % (ref 4.8–5.6)
HCT VFR BLD AUTO: 44.9 % (ref 34–46.6)
HGB BLD-MCNC: 14.2 G/DL (ref 12–15.9)
MCH RBC QN AUTO: 27.6 PG (ref 26.6–33)
MCHC RBC AUTO-ENTMCNC: 31.6 G/DL (ref 31.5–35.7)
MCV RBC AUTO: 87.2 FL (ref 79–97)
PLATELET # BLD AUTO: 245 10*3/MM3 (ref 140–450)
PMV BLD AUTO: 10.7 FL (ref 6–12)
POTASSIUM SERPL-SCNC: 4.4 MMOL/L (ref 3.5–5.2)
PROT ?TM UR-MCNC: <4 MG/DL
PROT SERPL-MCNC: 7.7 G/DL (ref 6–8.5)
PROT/CREAT UR: NORMAL MG/G{CREAT}
RBC # BLD AUTO: 5.15 10*6/MM3 (ref 3.77–5.28)
SODIUM SERPL-SCNC: 137 MMOL/L (ref 136–145)
T4 FREE SERPL-MCNC: 1.28 NG/DL (ref 0.92–1.68)
TSH SERPL DL<=0.05 MIU/L-ACNC: 1.43 UIU/ML (ref 0.27–4.2)
VIT B12 BLD-MCNC: 1896 PG/ML (ref 211–946)
WBC NRBC COR # BLD AUTO: 6.93 10*3/MM3 (ref 3.4–10.8)

## 2024-12-05 PROCEDURE — 85027 COMPLETE CBC AUTOMATED: CPT

## 2024-12-05 PROCEDURE — 84156 ASSAY OF PROTEIN URINE: CPT

## 2024-12-05 PROCEDURE — 84443 ASSAY THYROID STIM HORMONE: CPT

## 2024-12-05 PROCEDURE — 36415 COLL VENOUS BLD VENIPUNCTURE: CPT

## 2024-12-05 PROCEDURE — 80053 COMPREHEN METABOLIC PANEL: CPT

## 2024-12-05 PROCEDURE — 84439 ASSAY OF FREE THYROXINE: CPT

## 2024-12-05 PROCEDURE — 82570 ASSAY OF URINE CREATININE: CPT

## 2024-12-05 PROCEDURE — 82607 VITAMIN B-12: CPT

## 2024-12-05 PROCEDURE — 83036 HEMOGLOBIN GLYCOSYLATED A1C: CPT

## 2024-12-05 PROCEDURE — 82306 VITAMIN D 25 HYDROXY: CPT

## 2025-06-23 ENCOUNTER — TRANSCRIBE ORDERS (OUTPATIENT)
Dept: LAB | Facility: HOSPITAL | Age: 60
End: 2025-06-23
Payer: MEDICARE

## 2025-06-23 ENCOUNTER — LAB (OUTPATIENT)
Dept: LAB | Facility: HOSPITAL | Age: 60
End: 2025-06-23
Payer: MEDICARE

## 2025-06-23 DIAGNOSIS — E78.2 MIXED HYPERLIPIDEMIA: Primary | ICD-10-CM

## 2025-06-23 DIAGNOSIS — D50.0 BLOOD LOSS ANEMIA: ICD-10-CM

## 2025-06-23 DIAGNOSIS — E05.00 TOXIC DIFFUSE GOITER WITH PRETIBIAL MYXEDEMA: ICD-10-CM

## 2025-06-23 DIAGNOSIS — E11.9 DIABETES MELLITUS WITHOUT COMPLICATION: ICD-10-CM

## 2025-06-23 DIAGNOSIS — E55.9 VITAMIN D DEFICIENCY: ICD-10-CM

## 2025-06-23 DIAGNOSIS — E03.8 TOXIC DIFFUSE GOITER WITH PRETIBIAL MYXEDEMA: ICD-10-CM

## 2025-06-23 DIAGNOSIS — E78.2 MIXED HYPERLIPIDEMIA: ICD-10-CM

## 2025-06-23 DIAGNOSIS — E53.8 VITAMIN B12 DEFICIENCY (NON ANEMIC): ICD-10-CM

## 2025-06-23 LAB
25(OH)D3 SERPL-MCNC: 38.1 NG/ML (ref 30–100)
ALBUMIN SERPL-MCNC: 4 G/DL (ref 3.5–5.2)
ALBUMIN UR-MCNC: <1.2 MG/DL
ALBUMIN/GLOB SERPL: 1.2 G/DL
ALP SERPL-CCNC: 85 U/L (ref 39–117)
ALT SERPL W P-5'-P-CCNC: 30 U/L (ref 1–33)
ANION GAP SERPL CALCULATED.3IONS-SCNC: 15 MMOL/L (ref 5–15)
AST SERPL-CCNC: 28 U/L (ref 1–32)
BILIRUB SERPL-MCNC: 0.2 MG/DL (ref 0–1.2)
BUN SERPL-MCNC: 14 MG/DL (ref 8–23)
BUN/CREAT SERPL: 15.4 (ref 7–25)
CALCIUM SPEC-SCNC: 9.2 MG/DL (ref 8.6–10.5)
CHLORIDE SERPL-SCNC: 106 MMOL/L (ref 98–107)
CHOLEST SERPL-MCNC: 189 MG/DL (ref 0–200)
CO2 SERPL-SCNC: 19 MMOL/L (ref 22–29)
CREAT SERPL-MCNC: 0.91 MG/DL (ref 0.57–1)
CREAT UR-MCNC: 129.8 MG/DL
DEPRECATED RDW RBC AUTO: 47.2 FL (ref 37–54)
EGFRCR SERPLBLD CKD-EPI 2021: 71.9 ML/MIN/1.73
ERYTHROCYTE [DISTWIDTH] IN BLOOD BY AUTOMATED COUNT: 15 % (ref 12.3–15.4)
GLOBULIN UR ELPH-MCNC: 3.4 GM/DL
GLUCOSE SERPL-MCNC: 85 MG/DL (ref 65–99)
HBA1C MFR BLD: 5.7 % (ref 4.8–5.6)
HCT VFR BLD AUTO: 43 % (ref 34–46.6)
HDLC SERPL-MCNC: 38 MG/DL (ref 40–60)
HGB BLD-MCNC: 13.8 G/DL (ref 12–15.9)
IRON 24H UR-MRATE: 51 MCG/DL (ref 37–145)
IRON SATN MFR SERPL: 11 % (ref 20–50)
LDLC SERPL CALC-MCNC: 116 MG/DL (ref 0–100)
LDLC/HDLC SERPL: 2.92 {RATIO}
MCH RBC QN AUTO: 28.2 PG (ref 26.6–33)
MCHC RBC AUTO-ENTMCNC: 32.1 G/DL (ref 31.5–35.7)
MCV RBC AUTO: 87.9 FL (ref 79–97)
MICROALBUMIN/CREAT UR: NORMAL MG/G{CREAT}
PLATELET # BLD AUTO: 225 10*3/MM3 (ref 140–450)
PMV BLD AUTO: 10.6 FL (ref 6–12)
POTASSIUM SERPL-SCNC: 4.5 MMOL/L (ref 3.5–5.2)
PROT SERPL-MCNC: 7.4 G/DL (ref 6–8.5)
RBC # BLD AUTO: 4.89 10*6/MM3 (ref 3.77–5.28)
SODIUM SERPL-SCNC: 140 MMOL/L (ref 136–145)
T4 FREE SERPL-MCNC: 1.15 NG/DL (ref 0.92–1.68)
TIBC SERPL-MCNC: 448 MCG/DL (ref 298–536)
TRANSFERRIN SERPL-MCNC: 301 MG/DL (ref 200–360)
TRIGL SERPL-MCNC: 201 MG/DL (ref 0–150)
TSH SERPL DL<=0.05 MIU/L-ACNC: 1.83 UIU/ML (ref 0.27–4.2)
VIT B12 BLD-MCNC: 649 PG/ML (ref 211–946)
VLDLC SERPL-MCNC: 35 MG/DL (ref 5–40)
WBC NRBC COR # BLD AUTO: 6.41 10*3/MM3 (ref 3.4–10.8)

## 2025-06-23 PROCEDURE — 82043 UR ALBUMIN QUANTITATIVE: CPT

## 2025-06-23 PROCEDURE — 83540 ASSAY OF IRON: CPT

## 2025-06-23 PROCEDURE — 82607 VITAMIN B-12: CPT

## 2025-06-23 PROCEDURE — 80061 LIPID PANEL: CPT

## 2025-06-23 PROCEDURE — 84466 ASSAY OF TRANSFERRIN: CPT

## 2025-06-23 PROCEDURE — 82306 VITAMIN D 25 HYDROXY: CPT

## 2025-06-23 PROCEDURE — 82570 ASSAY OF URINE CREATININE: CPT

## 2025-06-23 PROCEDURE — 85027 COMPLETE CBC AUTOMATED: CPT

## 2025-06-23 PROCEDURE — 84443 ASSAY THYROID STIM HORMONE: CPT

## 2025-06-23 PROCEDURE — 84439 ASSAY OF FREE THYROXINE: CPT

## 2025-06-23 PROCEDURE — 83036 HEMOGLOBIN GLYCOSYLATED A1C: CPT

## 2025-06-23 PROCEDURE — 80053 COMPREHEN METABOLIC PANEL: CPT

## 2025-06-23 PROCEDURE — 36415 COLL VENOUS BLD VENIPUNCTURE: CPT

## 2025-06-25 ENCOUNTER — TRANSCRIBE ORDERS (OUTPATIENT)
Dept: ADMINISTRATIVE | Facility: HOSPITAL | Age: 60
End: 2025-06-25
Payer: MEDICARE

## 2025-06-25 DIAGNOSIS — Z12.31 SCREENING MAMMOGRAM FOR BREAST CANCER: Primary | ICD-10-CM

## 2025-07-31 ENCOUNTER — HOSPITAL ENCOUNTER (OUTPATIENT)
Dept: GENERAL RADIOLOGY | Facility: HOSPITAL | Age: 60
Discharge: HOME | End: 2025-07-31
Admitting: FAMILY MEDICINE
Payer: MEDICARE

## 2025-07-31 DIAGNOSIS — M54.2 PAIN IN NECK: ICD-10-CM

## 2025-07-31 PROCEDURE — 72052 X-RAY EXAM NECK SPINE 6/>VWS: CPT

## 2025-08-28 ENCOUNTER — OFFICE VISIT (OUTPATIENT)
Dept: PULMONOLOGY | Facility: CLINIC | Age: 60
End: 2025-08-28
Payer: MEDICARE

## 2025-08-28 VITALS
WEIGHT: 251.8 LBS | BODY MASS INDEX: 37.29 KG/M2 | SYSTOLIC BLOOD PRESSURE: 124 MMHG | HEIGHT: 69 IN | DIASTOLIC BLOOD PRESSURE: 72 MMHG | OXYGEN SATURATION: 95 % | HEART RATE: 88 BPM | RESPIRATION RATE: 18 BRPM

## 2025-08-28 DIAGNOSIS — G47.33 OBSTRUCTIVE SLEEP APNEA: Primary | ICD-10-CM

## 2025-08-28 DIAGNOSIS — E66.9 OBESITY (BMI 30-39.9): ICD-10-CM

## 2025-08-28 PROCEDURE — 99213 OFFICE O/P EST LOW 20 MIN: CPT | Performed by: NURSE PRACTITIONER

## 2025-08-28 RX ORDER — AMOXICILLIN 500 MG/1
1000 CAPSULE ORAL 3 TIMES DAILY
COMMUNITY

## (undated) DEVICE — JELLY,LUBE,STERILE,FLIP TOP,TUBE,2-OZ: Brand: MEDLINE

## (undated) DEVICE — GLV SURG SENSICARE W/ALOE PF LF 7.5 STRL

## (undated) DEVICE — STRIP,CLOSURE,WOUND,MEDI-STRIP,1/2X4: Brand: MEDLINE

## (undated) DEVICE — UNDYED BRAIDED (POLYGLACTIN 910), SYNTHETIC ABSORBABLE SUTURE: Brand: COATED VICRYL

## (undated) DEVICE — Device

## (undated) DEVICE — 1000 S-DRAPE TOWEL DRAPE 10/BX: Brand: STERI-DRAPE™

## (undated) DEVICE — SYR 10ML

## (undated) DEVICE — NDL HYPO ECLPS SFTY 25G 1 1/2IN

## (undated) DEVICE — CUP SPECI 4OZ LF STRL

## (undated) DEVICE — APPL CHLORAPREP HI/LITE TINTED 10.5ML ORNG

## (undated) DEVICE — SYR LUER SLPTP 50ML

## (undated) DEVICE — PENCL ES MEGADINE EZ/CLEAN BUTN W/HOLSTR 10FT

## (undated) DEVICE — ENDOGATOR AUXILIARY WATER JET CONNECTOR: Brand: ENDOGATOR

## (undated) DEVICE — DRSNG WND GZ PAD BORDERED LF 4X5IN STRL

## (undated) DEVICE — GLV SURG TRIUMPH MICRO PF LTX 7.5 STRL

## (undated) DEVICE — MARKR SKIN W/RULR

## (undated) DEVICE — SLV SCD CALF HEMOFORCE DVT THERP REPROC MD

## (undated) DEVICE — SPNG GZ STRL 2S 4X4 12PLY

## (undated) DEVICE — MEDI-VAC NON-CONDUCTIVE SUCTION TUBING: Brand: CARDINAL HEALTH

## (undated) DEVICE — RICH MAJOR PROCEDURE: Brand: MEDLINE INDUSTRIES, INC.

## (undated) DEVICE — SUT VIC 3/0 SH 27IN J416H